# Patient Record
Sex: FEMALE | Race: WHITE | NOT HISPANIC OR LATINO | Employment: FULL TIME | URBAN - METROPOLITAN AREA
[De-identification: names, ages, dates, MRNs, and addresses within clinical notes are randomized per-mention and may not be internally consistent; named-entity substitution may affect disease eponyms.]

---

## 2017-03-06 ENCOUNTER — GENERIC CONVERSION - ENCOUNTER (OUTPATIENT)
Dept: OTHER | Facility: OTHER | Age: 47
End: 2017-03-06

## 2017-06-13 ENCOUNTER — ALLSCRIPTS OFFICE VISIT (OUTPATIENT)
Dept: OTHER | Facility: OTHER | Age: 47
End: 2017-06-13

## 2017-10-24 ENCOUNTER — LAB REQUISITION (OUTPATIENT)
Dept: LAB | Facility: HOSPITAL | Age: 47
End: 2017-10-24
Payer: COMMERCIAL

## 2017-10-24 ENCOUNTER — ALLSCRIPTS OFFICE VISIT (OUTPATIENT)
Dept: OTHER | Facility: OTHER | Age: 47
End: 2017-10-24

## 2017-10-24 DIAGNOSIS — Z01.419 ENCOUNTER FOR GYNECOLOGICAL EXAMINATION WITHOUT ABNORMAL FINDING: ICD-10-CM

## 2017-10-24 DIAGNOSIS — Z12.31 ENCOUNTER FOR SCREENING MAMMOGRAM FOR MALIGNANT NEOPLASM OF BREAST: ICD-10-CM

## 2017-10-24 PROCEDURE — G0145 SCR C/V CYTO,THINLAYER,RESCR: HCPCS | Performed by: OBSTETRICS & GYNECOLOGY

## 2017-10-24 PROCEDURE — 87624 HPV HI-RISK TYP POOLED RSLT: CPT | Performed by: OBSTETRICS & GYNECOLOGY

## 2017-10-25 NOTE — PROGRESS NOTES
Assessment  1  Encounter for annual routine gynecological examination (V72 31) (Z01 419)  2  Encounter for screening mammogram for breast cancer (V76 12) (Z12 31)  3  Genital herpes (054 10) (A60 00)    Plan  Encounter for annual routine gynecological examination    · Follow-up visit in 1 year Evaluation and Treatment  Follow-up  Status: Hold For -  Scheduling  Requested for: [id number]  Ordered; For: Encounter for annual routine gynecological examination;  Ordered By:   Suraj Mcgrath  Performed:   Due: [id number]  Encounter for screening mammogram for breast cancer    · MAMMO SCREENING BILATERAL W 3D & CAD; Status:Hold For - Scheduling; Requested  GSX:[id number];   Perform:[facility] Radiology; QAS:[id number]; Ordered; For:Encounter for screening   mammogram for breast cancer; Ordered By:Gerard Johnston;  Genital herpes    · Start: ValACYclovir HCl - 500 MG Oral Tablet; Take 1 tablet daily  Rx By: Suraj Mcgrath; Dispense: 90 Days ; #:90 Tablet; Refill: 3;For: Genital herpes;   HUMERA = N; Sent To: [facility]    Discussion/Summary  Currently, she eats an adequate diet and has an adequate exercise regimen  the risks and benefits of cervical cancer screening were discussed HPV and Pap Co-testing Done Today Breast cancer screening: the risks and benefits of breast cancer screening were discussed and mammogram has been ordered  Advice and education were given regarding aerobic exercise, weight bearing exercise, weight loss, calcium supplements and vitamin D supplements  NGEintermittent, rec Aleve 1 q 8 hrs with onset of flow, call if worsens  Nl EMB 10/15continue suppressive Rx'17, '201 yr monthlyD Mammo- Strong FH of Breast Ca, S 41 w BRCA neg Ca1,000 mg/d3/wk     Chief Complaint  pt here for yearly exam  LMP unknown  last pap 10/13/2014 last mammo 3/7/17 due for cotesting today      History of Present Illness  HPI: Linn Cruz is here for annual visit  has had no further abnormal periods  has PMS symptoms right now so she believes she is about to have a period although it's been more than 4 weeks  The past few months she has been experiencing hot flashes  has a migraine headache today  They seem to be getting more frequent and severe  Last month she vomited for the first time  recurrences of HSV  Review of Systems    Constitutional: recent 6 lb weight gain, but-- no fever,-- not feeling poorly,-- no chills-- and-- not feeling tired  ENT: no ear ache, no loss of hearing, no nosebleeds or nasal discharge, no sore throat or hoarseness  Cardiovascular: no complaints of slow or fast heart rate, no chest pain, no palpitations, no leg claudication or lower extremity edema  Respiratory: no complaints of shortness of breath, no wheezing, no dyspnea on exertion, no orthopnea or PND  Breasts: no complaints of breast pain, breast lump or nipple discharge  Gastrointestinal: constipation-- and-- stable chronic constipation, but-- no abdominal pain,-- no nausea,-- no vomiting,-- no diarrhea-- and-- no blood in stools  Genitourinary: Coitus is twice monthly w/o problems  , but-- no complaints of dysuria, no incontinence, no pelvic pain, no dysmenorrhea, no vaginal discharge or abnormal vaginal bleeding  Musculoskeletal: no complaints of arthralgia, no myalgia, no joint swelling or stiffness, no limb pain or swelling  Integumentary: no complaints of skin rash or lesion, no itching or dry skin, no skin wounds  Neurological: no complaints of headache, no confusion, no numbness or tingling, no dizziness or fainting  Active Problems  1  Abnormal uterine bleeding (AUB) (626 9) (N93 9)  2  Constipation (564 00) (K59 00)  3  Encounter for annual routine gynecological examination (V72 31) (Z01 419)  4  Genital herpes (054 10) (A60 00)  5   Migraines (346 90) (G43 909)    Past Medical History   · History of H/O:  (V45 89) (Z98 891)     ; C/S Breech 26 , Marginal Abruption, PML- Sigmund  3# 1 oz  3/00;  27 wks, 30% Abruption/PML  Shea 2-11, both had 1st trimester bleeding  LTL   Chronic Constipation/ Rectal fissures  Hemorrhoidectomy '  Migraines  HSV 1&2 '  AUB 10/15 EMB, sounded 7 cm, proliferative  R Foot- 2 Stress Fx's - power walking        Surgical History   · History of  Section   · History of Tubal Ligation    Family History  Father    · Family history of cardiac disorder (V17 49) (Z82 49)  Sister    · Family history of asthma (V17 5) (Z82 5)   · Family history of cardiac disorder (V17 49) (Z82 49)   · Family history of malignant neoplasm of breast (V16 3) (Z80 3)      S- R  breast cancer  41- double mastectomy with chemotherapy and radiation 40, BRCA neg  MC's- Breast Ca 53, 26  MGGM- Breast Ca  F d 53 MI  PGF- d 44 MI     Social History   · Acute alcohol use (Z72 89)   · Currently sexually active   · Daily caffeine consumption   · Exercises regularly   · Never a smoker   · No drug use   · Two children       Marrried  Jannette Brizuela is doing well  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[employer], [address] [facility] Keturah is 16, driving, in the band, but not looking into [employer]  Jessi Jaimes is 14  Current Meds  1  Relpax 40 MG Oral Tablet; Therapy: (Recorded:2016) to Recorded  2  ValACYclovir HCl - 500 MG Oral Tablet; Take 1 tablet daily; Therapy:  to (Evaluate:2017)  Requested for: 2016; Last   Rx:2016 Ordered    Vitals   Recorded: 76SGF1712 09:18AM   Heart Rate 73   Systolic 692   Diastolic 62   Height 5 ft 10 in   Weight 180 lb 6 oz   BMI Calculated 25 88   BSA Calculated 2   O2 Saturation 98   LMP unknown     Physical Exam    Constitutional   General appearance: No acute distress, well appearing and well nourished  Neck   Neck: Normal, supple, trachea midline, no masses  Thyroid: Normal, no thyromegaly  Pulmonary   Respiratory effort: No increased work of breathing or signs of respiratory distress      Auscultation of lungs: Clear to auscultation  Cardiovascular   Auscultation of heart: Normal rate and rhythm, normal S1 and S2, no murmurs  Peripheral vascular exam: Normal pulses Throughout  Genitourinary   External genitalia: Normal and no lesions appreciated  Vagina: Normal, no lesions or dryness appreciated  Urethra: Normal     Urethral meatus: Normal     Bladder: Normal, soft, non-tender and no prolapse or masses appreciated  Cervix: Normal, no palpable masses  Uterus: Normal, non-tender, not enlarged, and no palpable masses  -- AV  Adnexa/parametria: Normal, non-tender and no fullness or masses appreciated  Anus, perineum, and rectum: Normal sphincter tone, no masses, and no prolapse  Chest   Breasts: Normal and no dimpling or skin changes noted  Abdomen   Abdomen: Normal, non-tender, and no organomegaly noted  Liver and spleen: No hepatomegaly or splenomegaly  Examination for hernias: No hernias appreciated  Lymphatic   Palpation of lymph nodes in neck, axillae, groin and/or other locations: No lymphadenopathy or masses noted  Skin   Skin and subcutaneous tissue: Normal skin turgor and no rashes      Palpation of skin and subcutaneous tissue: Normal     Psychiatric   Orientation to person, place, and time: Normal     Mood and affect: Normal        Signatures   Electronically signed by : PAULINE Garnica ; Oct 24 2017  2:06PM EST                       (Author)

## 2017-10-26 ENCOUNTER — GENERIC CONVERSION - ENCOUNTER (OUTPATIENT)
Dept: OTHER | Facility: OTHER | Age: 47
End: 2017-10-26

## 2017-10-27 LAB — HPV RRNA GENITAL QL NAA+PROBE: NORMAL

## 2017-11-01 ENCOUNTER — GENERIC CONVERSION - ENCOUNTER (OUTPATIENT)
Dept: OTHER | Facility: OTHER | Age: 47
End: 2017-11-01

## 2017-11-01 LAB
LAB AP GYN PRIMARY INTERPRETATION: NORMAL
Lab: NORMAL

## 2018-01-12 VITALS
OXYGEN SATURATION: 98 % | SYSTOLIC BLOOD PRESSURE: 116 MMHG | BODY MASS INDEX: 25.5 KG/M2 | DIASTOLIC BLOOD PRESSURE: 58 MMHG | HEIGHT: 70 IN | WEIGHT: 178.13 LBS | HEART RATE: 74 BPM

## 2018-01-13 VITALS
HEART RATE: 73 BPM | BODY MASS INDEX: 25.82 KG/M2 | OXYGEN SATURATION: 98 % | DIASTOLIC BLOOD PRESSURE: 62 MMHG | WEIGHT: 180.38 LBS | HEIGHT: 70 IN | SYSTOLIC BLOOD PRESSURE: 106 MMHG

## 2018-01-17 NOTE — RESULT NOTES
Verified Results  (1) THIN PREP PAP WITH IMAGING [id number] 09:43AM Pricila ENNIS Order Number: [id number]     Test Name Result Flag Reference   LAB AP CASE REPORT (Report)     Gynecologic Cytology Report            Case: [id number]                  Authorizing Provider: Vernon Omalley MD     Collected:      10/24/2017 0943        First Screen:     SAMMY Bauer Received:      10/26/2017 1023        Specimen:  LIQUID-BASED PAP, SCREENING, Cervix   HPV HIGH RISK RESULT (Report)     HPV, High Risk: HPV NEG, HPV16 NEG, HPV18 NEG      Other High Risk HPV Negative, HPV 16 Negative, HPV 18 Negative  HPV types: 16,18,31,33,35,39,45,51,52,56,58,59,66 and 68 DNA are undetectable or below the pre-set threshold  Microdermis FDA approved Amelia 4800 is utilized with strict adherence to the manufacturers instruction  manual to test for the presence of High-Risk HPV DNA, as well as HPV 16 and HPV 18  This instrument  has been validated by our laboratory and/or by the   A negative result does not preclude the presence of HPV infection because results depend on adequate  specimen collection, absence of inhibitors and sufficient DNA to be detected  Additionally, HPV negative  results are not intended to prevent women from proceeding to colposcopy if clinically warranted  Positive HPV test results indicate the presence of any one or more of the high risk types, but since patients  are often co-infected with low-risk types it does not rule out the presence of low-risk types in patients  with mixed infections  LAB AP GYN PRIMARY INTERPRETATION      Negative for intraepithelial lesion or malignancy  Electronically signed by SAMMY Bauer on 11/1/2017 at 10:23 AM   LAB AP GYN SPECIMEN ADEQUACY      Satisfactory for evaluation  Endocervical/transformation zone component present     LAB AP GYN ADDITIONAL INFORMATION (Report)     [employer]'s FDA approved ,  and ThinPrep Imaging System are   utilized with strict adherence to the 's instruction manual to   prepare gynecologic and non-gynecologic cytology specimens for the   production of ThinPrep slides as well as for gynecologic ThinPrep imaging  These processes have been validated by our laboratory and/or by the     The Pap test is not a diagnostic procedure and should not be used as the   sole means to detect cervical cancer  It is only a screening procedure to   aid in the detection of cervical cancer and its precursors  Both   false-negative and false-positive results have been experienced  Your   patient's test result should be interpreted in this context together with   the history and clinical findings

## 2018-05-03 ENCOUNTER — TELEPHONE (OUTPATIENT)
Dept: GYNECOLOGY | Facility: CLINIC | Age: 48
End: 2018-05-03

## 2018-05-03 DIAGNOSIS — R92.8 ABNORMAL MAMMOGRAM OF RIGHT BREAST: Primary | ICD-10-CM

## 2018-05-03 NOTE — TELEPHONE ENCOUNTER
Daren Blanton from [facility] called RE abnormal mammo result  Can you please look at result (in her chart under media tab) she is going back for additional testing on Monday and needs the script

## 2018-05-14 ENCOUNTER — HOSPITAL ENCOUNTER (OUTPATIENT)
Dept: ULTRASOUND IMAGING | Facility: CLINIC | Age: 48
Discharge: HOME | End: 2018-05-14
Payer: COMMERCIAL

## 2018-05-14 ENCOUNTER — HOSPITAL ENCOUNTER (OUTPATIENT)
Dept: MAMMOGRAPHY | Facility: CLINIC | Age: 48
Discharge: HOME | End: 2018-05-14
Payer: COMMERCIAL

## 2018-05-14 DIAGNOSIS — R92.8 ABNORMAL MAMMOGRAM OF RIGHT BREAST: ICD-10-CM

## 2018-05-14 DIAGNOSIS — R92.8 ABNORMAL MAMMOGRAM: ICD-10-CM

## 2018-05-14 PROCEDURE — 77065 DX MAMMO INCL CAD UNI: CPT

## 2018-05-14 PROCEDURE — G0279 TOMOSYNTHESIS, MAMMO: HCPCS

## 2018-05-15 DIAGNOSIS — R92.8 ABNORMALITY OF RIGHT BREAST ON SCREENING MAMMOGRAM: Primary | ICD-10-CM

## 2018-05-24 ENCOUNTER — HOSPITAL ENCOUNTER (OUTPATIENT)
Dept: ULTRASOUND IMAGING | Facility: CLINIC | Age: 48
Discharge: HOME | End: 2018-05-24
Payer: COMMERCIAL

## 2018-05-24 DIAGNOSIS — R92.8 ABNORMALITY OF RIGHT BREAST ON SCREENING MAMMOGRAM: ICD-10-CM

## 2018-05-24 PROCEDURE — 76642 ULTRASOUND BREAST LIMITED: CPT

## 2018-05-24 RX ORDER — ELETRIPTAN HYDROBROMIDE 40 MG/1
TABLET, FILM COATED ORAL
COMMUNITY
End: 2018-10-31 | Stop reason: ALTCHOICE

## 2018-05-24 RX ORDER — MULTIVITAMIN
1 TABLET ORAL DAILY
COMMUNITY

## 2018-05-24 NOTE — PROGRESS NOTES
Met with patient and Dr Marly James regarding recommendation for;      __x___ RIGHT ______LEFT      __x___Ultrasound guided  ______Stereotactic  Breast biopsy  __x___Verbalized understanding        Blood thinners:  _____yes ___x__no    Date stopped: ____n/a_______    Biopsy teaching sheet given:  ____x___yes ______no

## 2018-06-07 ENCOUNTER — HOSPITAL ENCOUNTER (OUTPATIENT)
Dept: ULTRASOUND IMAGING | Facility: CLINIC | Age: 48
Discharge: HOME | End: 2018-06-07

## 2018-06-11 ENCOUNTER — HOSPITAL ENCOUNTER (OUTPATIENT)
Dept: ULTRASOUND IMAGING | Facility: CLINIC | Age: 48
Discharge: HOME | End: 2018-06-11
Payer: COMMERCIAL

## 2018-06-11 ENCOUNTER — HOSPITAL ENCOUNTER (OUTPATIENT)
Dept: MAMMOGRAPHY | Facility: CLINIC | Age: 48
Discharge: HOME | End: 2018-06-11

## 2018-06-11 ENCOUNTER — HOSPITAL ENCOUNTER (OUTPATIENT)
Dept: ULTRASOUND IMAGING | Facility: CLINIC | Age: 48
Discharge: HOME | End: 2018-06-11
Admitting: RADIOLOGY
Payer: COMMERCIAL

## 2018-06-11 VITALS — DIASTOLIC BLOOD PRESSURE: 64 MMHG | SYSTOLIC BLOOD PRESSURE: 112 MMHG | HEART RATE: 68 BPM

## 2018-06-11 DIAGNOSIS — R92.8 ABNORMAL MAMMOGRAM: ICD-10-CM

## 2018-06-11 DIAGNOSIS — R92.8 ABNORMAL ULTRASOUND OF BREAST: ICD-10-CM

## 2018-06-11 PROCEDURE — 88305 TISSUE EXAM BY PATHOLOGIST: CPT | Performed by: PATHOLOGY

## 2018-06-11 PROCEDURE — 19083 BX BREAST 1ST LESION US IMAG: CPT

## 2018-06-11 PROCEDURE — 19084 BX BREAST ADD LESION US IMAG: CPT

## 2018-06-11 RX ORDER — LIDOCAINE HYDROCHLORIDE 10 MG/ML
5 INJECTION, SOLUTION INFILTRATION; PERINEURAL ONCE
Status: COMPLETED | OUTPATIENT
Start: 2018-06-11 | End: 2018-06-11

## 2018-06-11 RX ADMIN — LIDOCAINE HYDROCHLORIDE 5 ML: 10 INJECTION, SOLUTION INFILTRATION; PERINEURAL at 14:34

## 2018-06-11 RX ADMIN — LIDOCAINE HYDROCHLORIDE 5 ML: 10 INJECTION, SOLUTION INFILTRATION; PERINEURAL at 14:44

## 2018-06-11 NOTE — PROGRESS NOTES
Procedure type: (1st site)    __x___ultrasound guided _____stereotactic    Breast:    _____Left __x___Right    Location: 11:00 periareolar    Needle: 12g Marquee    # of passes: 3 (specimen A)    Clip: Ultraclip-heart    Performed by: Dr Richardson Tesfaye held for 5 minutes by: Ayana Fregoso Strips:    __x___yes _____no    Band aid:    __x___yes_____no    Tape and guaze:    _____yes __x___no    Tolerated procedure:    __x___yes _____no      Procedure type: (2nd site)    __x___ultrasound guided _____stereotactic    Breast:    _____Left __x___Right    Location: 2:00 5cm from nipple    Needle: 12g Marquee    # of passes: 3 (specimen B)    Clip: Ultraclip-wing    Performed by: Dr Richardson Tesfaye held for 5 minutes by: Ayana Fregoso Strips:    __x___yes _____no    Band aid:    __x___yes_____no    Tape and guaze:    _____yes __x___no    Tolerated procedure:    __x___yes _____no

## 2018-06-11 NOTE — DISCHARGE INSTR - OTHER ORDERS
POST LARGE CORE BREAST BIOPSY PATIENT INFORMATION      1  Place an ice pack inside your bra over the top of the dressing every hour for 20 minutes (20 minutes on, 60 minutes off)  Do this until bedtime  2  Do not shower or bathe until the following morning  3  You may bathe your breast carefully with the steri-strips in place  Be careful    Not to loosen them  The steri-strips will fall off in 3-5 days  4  You may have mild discomfort, and you may have some bruising where the   Needle entered the skin  This should clear within 5-7 days  5  If you need medicine for discomfort, take acetaminophen products such as   Tylenol  You may also take Advil or Motrin products  6  Do not participate in strenuous activities such as-tennis, aerobics, skiing,  Weight lifting, etc  for 24 hours  Refrain from swimming/soaking for 72 hours  7  Wearing a bra for sleeping may be more comfortable for the first 24-48 hours  8  Watch for continued bleeding, pain or fever over 101; please call with any questions or concerns  For procedures done at the [facility] call:  Dustin Rushing RN at [phone]  Lindsey Conroy RN at [phone]                    *After 4 PM call the Interventional Radiology Department                    [phone] and ask to speak with the nurse on call  For procedures done at the [address] [place] [address] call:         Aiden Pedroza RN at   *After 4 PM call the Interventional Radiology Department   [phone] and ask to speak with the nurse on call  For procedures done at [address] [place] [address] call: The Radiology Nurse at [phone]  *After 4 PM call your physician, or go to the Emergency Department  9          The final results of your biopsy are usually available within one week

## 2018-06-11 NOTE — PROGRESS NOTES
Patient arrived via:    __x___ambulatory    _____wheelchair    _____stretcher      Domestic violence screen    __x____negative______positive    Breast Implants:    _______yes ____x____no

## 2018-06-12 NOTE — PROGRESS NOTES
Post procedure call completed on 6/12/18 at 1328    Bleeding: _____yes __x___no    Pain: _____yes ___x___no    Redness/Swelling: ______yes ___x___no    Band aid removed: _____yes __x___no    Steri-Strips intact: ___x___yes _____no

## 2018-06-14 ENCOUNTER — TELEPHONE (OUTPATIENT)
Dept: MAMMOGRAPHY | Facility: CLINIC | Age: 48
End: 2018-06-14

## 2018-09-19 ENCOUNTER — TRANSCRIBE ORDERS (OUTPATIENT)
Dept: ADMINISTRATIVE | Facility: HOSPITAL | Age: 48
End: 2018-09-19

## 2018-09-19 DIAGNOSIS — M85.80 OSTEOPENIA, UNSPECIFIED LOCATION: Primary | ICD-10-CM

## 2018-10-08 ENCOUNTER — HOSPITAL ENCOUNTER (OUTPATIENT)
Dept: RADIOLOGY | Facility: MEDICAL CENTER | Age: 48
Discharge: HOME | End: 2018-10-08
Payer: COMMERCIAL

## 2018-10-08 DIAGNOSIS — M85.80 OSTEOPENIA, UNSPECIFIED LOCATION: ICD-10-CM

## 2018-10-08 PROCEDURE — 77080 DXA BONE DENSITY AXIAL: CPT

## 2018-10-30 PROBLEM — Z01.419 ENCOUNTER FOR ANNUAL ROUTINE GYNECOLOGICAL EXAMINATION: Status: ACTIVE | Noted: 2018-10-30

## 2018-10-30 PROBLEM — Z12.31 ENCOUNTER FOR SCREENING MAMMOGRAM FOR MALIGNANT NEOPLASM OF BREAST: Status: ACTIVE | Noted: 2018-10-30

## 2018-10-30 NOTE — PROGRESS NOTES
Assessment/Plan:  NGE  Perimenopausal- menses February and October this year  Past Menorrhagia- intermittent, rec Aleve 1 q 8 hrs with onset of flow, call if worsens  Nl EMB 10/15  Vaginal dryness - silicone lubricant  HSV- no recurrences while off suppressive therapy- changed to episodic Rx  CoTesting '17, '22  RTO 1 yr  SBE monthly  3 D Mammo- Strong FH of Breast Ca, S 40 w BRCA neg Ca  Ca 1,000 mg/d-    Exercise 3/wk - 3rd stress Fx  Colonoscopy- due soon       Diagnoses and all orders for this visit:    Encounter for annual routine gynecological examination    Encounter for screening mammogram for malignant neoplasm of breast  -     Mammo screening bilateral w 3d & cad; Future    History of herpes genitalis    Other orders  -     Discontinue: Multiple Vitamins-Minerals (MULTIVITAMIN ADULT PO); Take 1 tablet by mouth  -     prednisoLONE acetate (PRED FORTE) 1 % ophthalmic suspension; PUT 1 DROP INTO LEFT EYE 3 TIMES A DAY  -     valACYclovir (VALTREX) 500 mg tablet; Take 1 tablet by mouth daily  -     aspirin-acetaminophen-caffeine (EXCEDRIN MIGRAINE) 536-588-62 MG per tablet; Take by mouth  -     eletriptan (RELPAX) 40 MG tablet; Take one tablet at onset of migraine    One tablet 2 hours later PRN  -     Calcium Carb-Cholecalciferol (CALCIUM 600-D PO); Take by mouth              Subjective:        Patient ID: Karoline Lemon is a 46 y o  female  Susan is here for an annual visit  Her periods have been coming irregular  Her last normal menses was this past February  During the summer she had hot flashes, night sweats and recently vaginal dryness with intercourse  On October 25th she had another period  In August she had a 3rd stress fracture  All 3 have involved right foot  She had a bone density study recently and is seeing a physician today to discuss the results  She has not been able to exercise and is result has gained another 7 lb this year for total of 13 in the past 2 years      She still takes to stool softeners daily and then a fiber for constipation  She is due to have a colonoscopy soon  The following portions of the patient's history were reviewed and updated as appropriate: She  has a past medical history of Stress fracture of foot  Patient Active Problem List    Diagnosis Date Noted    History of herpes genitalis 10/31/2018    Encounter for annual routine gynecological examination 10/30/2018    Encounter for screening mammogram for malignant neoplasm of breast 10/30/2018   PMH:  Kartik Jo; C/S Breech 28 , Marginal Abruption, PML- Chevis Speaker 3# 1 oz  3/00;  27 wks, 30% Abruption/PML  Shea 2-11, both had 1st trimester bleeding  LTL   Chronic Constipation/ Rectal fissures  Hemorrhoidectomy '  Migraines  HSV 1&2 '  AUB 10/15 EMB, sounded 7 cm, proliferative  R Foot- 2 Stress Fx's - power walking    She  has a past surgical history that includes Breast lumpectomy  Her family history includes Breast cancer in her sister; Heart attack in her father and paternal grandfather; No Known Problems in her daughter, mother, sister, and son  FH:  S- R breast cancer 41- double mastectomy with chemotherapy and radiation 40, BRCA neg  MC's- Breast Ca 53, 26  MGGM- Breast Ca  F d 53 MI  PGF- d 44 MI     She  reports that she has never smoked  She has never used smokeless tobacco  She reports that she does not drink alcohol or use drugs  SH:  Marrrhenna Ramirez is doing well  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[employer], [address]  Chevis Speaker  is  23,  driving, in the band, studying math at [employer]  Lesia Katz is 14  Senior playing volleyball at [employer]  Current Outpatient Prescriptions   Medication Sig Dispense Refill    aspirin-acetaminophen-caffeine (EXCEDRIN MIGRAINE) 250-250-65 MG per tablet Take by mouth      Calcium Carb-Cholecalciferol (CALCIUM 600-D PO) Take by mouth      eletriptan (RELPAX) 40 MG tablet Take one tablet at onset of migraine    One tablet 2 hours later PRN      Multiple Vitamin (MULTIVITAMIN) tablet Take 1 tablet by mouth daily      prednisoLONE acetate (PRED FORTE) 1 % ophthalmic suspension PUT 1 DROP INTO LEFT EYE 3 TIMES A DAY  0    valACYclovir (VALTREX) 500 mg tablet Take 1 tablet by mouth daily       No current facility-administered medications for this visit  Current Outpatient Prescriptions on File Prior to Visit   Medication Sig    Multiple Vitamin (MULTIVITAMIN) tablet Take 1 tablet by mouth daily    [DISCONTINUED] Aspirin-Acetaminophen-Caffeine (EXCEDRIN PO) Take by mouth    [DISCONTINUED] eletriptan (RELPAX) 40 MG tablet Take by mouth     No current facility-administered medications on file prior to visit  She is allergic to other       Review of Systems   Constitutional: Negative for activity change, appetite change, fatigue and unexpected weight change  Hot flashes and night sweats during the past summer  Eyes: Negative for visual disturbance  Respiratory: Negative for cough, chest tightness, shortness of breath and wheezing  Cardiovascular: Negative for chest pain, palpitations and leg swelling  Breast: Patient denies tenderness, nipple discharge, masses, or erythema  Gastrointestinal: Positive for constipation (Chronic for which she takes Colace twice a day and then a fiber)  Negative for abdominal distention, abdominal pain, blood in stool, diarrhea, nausea and vomiting  Endocrine: Negative for cold intolerance and heat intolerance  Genitourinary: Positive for dyspareunia  Negative for decreased urine volume, difficulty urinating, dysuria, frequency, hematuria, menstrual problem, pelvic pain, urgency, vaginal bleeding, vaginal discharge and vaginal pain  Painful intercourse which is limited frequency at approximately once a month  Musculoskeletal: Negative for arthralgias  Third stress fracture in the same foot, right, this past August   Skin: Negative for rash  Neurological: Negative for weakness, light-headedness, numbness and headaches  Hematological: Does not bruise/bleed easily  Psychiatric/Behavioral: Negative for agitation, behavioral problems and sleep disturbance  The patient is not nervous/anxious  Objective:    Vitals:    10/31/18 0904   BP: 102/60   BP Location: Right arm   Patient Position: Sitting   Pulse: 64   Weight: 84 9 kg (187 lb 3 2 oz)   Height: 5' 10" (1 778 m)            Physical Exam   Constitutional: She is oriented to person, place, and time  She appears well-developed and well-nourished  HENT:   Head: Normocephalic and atraumatic  Eyes: Pupils are equal, round, and reactive to light  Conjunctivae and EOM are normal    Neck: Normal range of motion  Neck supple  No tracheal deviation present  No thyromegaly present  Cardiovascular: Normal rate, regular rhythm and normal heart sounds  No murmur heard  Pulmonary/Chest: Effort normal and breath sounds normal  No respiratory distress  She has no wheezes  Right breast exhibits no inverted nipple, no mass, no nipple discharge, no skin change and no tenderness  Left breast exhibits no inverted nipple, no mass, no nipple discharge, no skin change and no tenderness  Breasts are symmetrical    Abdominal: Soft  Bowel sounds are normal  She exhibits no distension and no mass  There is no tenderness  Genitourinary: Vagina normal and uterus normal  Rectal exam shows no external hemorrhoid  No breast swelling, tenderness, discharge or bleeding  There is no rash, tenderness or lesion on the right labia  There is no rash, tenderness or lesion on the left labia  Uterus is not deviated, not enlarged and not tender  Cervix exhibits no motion tenderness and no discharge  Right adnexum displays no mass, no tenderness and no fullness  Left adnexum displays no mass, no tenderness and no fullness  Genitourinary Comments: Vagina dry due tampon    Normal rugations   Musculoskeletal: Normal range of motion  Neurological: She is alert and oriented to person, place, and time  Skin: Skin is warm and dry  Psychiatric: She has a normal mood and affect  Her behavior is normal  Judgment and thought content normal    Nursing note and vitals reviewed

## 2018-10-31 ENCOUNTER — ANNUAL EXAM (OUTPATIENT)
Dept: GYNECOLOGY | Facility: CLINIC | Age: 48
End: 2018-10-31
Payer: COMMERCIAL

## 2018-10-31 VITALS
WEIGHT: 187.2 LBS | HEART RATE: 64 BPM | HEIGHT: 70 IN | DIASTOLIC BLOOD PRESSURE: 60 MMHG | BODY MASS INDEX: 26.8 KG/M2 | SYSTOLIC BLOOD PRESSURE: 102 MMHG

## 2018-10-31 DIAGNOSIS — Z86.19 HISTORY OF HERPES GENITALIS: ICD-10-CM

## 2018-10-31 DIAGNOSIS — Z01.419 ENCOUNTER FOR ANNUAL ROUTINE GYNECOLOGICAL EXAMINATION: Primary | ICD-10-CM

## 2018-10-31 DIAGNOSIS — Z12.31 ENCOUNTER FOR SCREENING MAMMOGRAM FOR MALIGNANT NEOPLASM OF BREAST: ICD-10-CM

## 2018-10-31 PROCEDURE — S0612 ANNUAL GYNECOLOGICAL EXAMINA: HCPCS | Performed by: OBSTETRICS & GYNECOLOGY

## 2018-10-31 RX ORDER — VALACYCLOVIR HYDROCHLORIDE 500 MG/1
1 TABLET, FILM COATED ORAL DAILY
COMMUNITY
Start: 2016-10-19 | End: 2019-11-22

## 2018-10-31 RX ORDER — ELETRIPTAN HYDROBROMIDE 40 MG/1
TABLET, FILM COATED ORAL
COMMUNITY
Start: 2018-06-07

## 2018-10-31 RX ORDER — PREDNISOLONE ACETATE 10 MG/ML
SUSPENSION/ DROPS OPHTHALMIC
Refills: 0 | COMMUNITY
Start: 2018-10-08 | End: 2021-12-10 | Stop reason: ALTCHOICE

## 2018-10-31 RX ORDER — VALACYCLOVIR HYDROCHLORIDE 500 MG/1
500 TABLET, FILM COATED ORAL 2 TIMES DAILY
Qty: 6 TABLET | Refills: 3 | Status: SHIPPED | OUTPATIENT
Start: 2018-10-31 | End: 2018-11-03

## 2018-10-31 RX ORDER — ACETAMINOPHEN, ASPIRIN AND CAFFEINE 250; 250; 65 MG/1; MG/1; MG/1
TABLET, FILM COATED ORAL
COMMUNITY

## 2019-08-08 ENCOUNTER — HOSPITAL ENCOUNTER (OUTPATIENT)
Dept: MAMMOGRAPHY | Facility: CLINIC | Age: 49
Discharge: HOME | End: 2019-08-08
Payer: COMMERCIAL

## 2019-08-08 VITALS — HEIGHT: 70 IN | WEIGHT: 187 LBS | BODY MASS INDEX: 26.77 KG/M2

## 2019-08-08 DIAGNOSIS — Z12.31 ENCOUNTER FOR SCREENING MAMMOGRAM FOR MALIGNANT NEOPLASM OF BREAST: ICD-10-CM

## 2019-08-08 PROCEDURE — 77063 BREAST TOMOSYNTHESIS BI: CPT

## 2019-08-08 PROCEDURE — 77067 SCR MAMMO BI INCL CAD: CPT

## 2019-11-21 NOTE — PROGRESS NOTES
Assessment/Plan:  NGE  Perimenopausal- menses irregular   Vaginal dryness - silicone lubricant  HSV- no recurrences while off suppressive therapy- changed to episodic Rx  Osteopenia- BMD 10/18 T-1 8 Spine, FRAX 0 1/3 8%  CoTesting   RTO 1 yr  SBE monthly  3 D Mammo- Strong FH of Breast Ca, S 40 w BRCA neg Ca  Ca 1,200 mg/d-    Exercise 5/wk - 3rd stress Fx  Colonoscopy- due soon       Diagnoses and all orders for this visit:    Encounter for annual routine gynecological examination    Encounter for screening mammogram for malignant neoplasm of breast  -     Mammo screening bilateral w 3d & cad; Future    History of herpes genitalis    Osteopenia of lumbar spine              Subjective:        Patient ID: Darya Beth is a 44 y o  female  Susan is here for her annual visit  Her periods are irregular  Her last period was in September  [place] is twice a month  She lost 2 lb  She has intermittent hot flashes  No outbreaks of herpes this past year  The following portions of the patient's history were reviewed and updated as appropriate: She  has a past medical history of Stress fracture of foot  Patient Active Problem List    Diagnosis Date Noted    History of herpes genitalis 10/31/2018    Encounter for annual routine gynecological examination 10/30/2018    Encounter for screening mammogram for malignant neoplasm of breast 10/30/2018   PMH:  [place] B[place]; C/S Breech 28 2, Marginal Abruption, PML- Lele Leyden 3# 1 oz  3;  27 wks, 30% Abruption/PML  Shea 2-11, both had 1st trimester bleeding  LTL   Chronic Constipation/ Rectal fissures  Hemorrhoidectomy '  Migraines  HSV 1&2 '  AUB 10/15 EMB, sounded 7 cm, proliferative      R Foot- 2 Stress Fx's - power walking      R Foot -3rd Stress Fx  ;  BMD 10/18      R Breast Bx (2)       L Ripped Retina  laser     She  has a past surgical history that includes Breast lumpectomy; US guided breast biopsy right complete (Right, 6/11/2018); US guidance breast biopsy right each additional (Right, 6/11/2018); and Breast biopsy (Right, 06/11/2018)  Her family history includes BRCA1 Negative in her sister; BRCA2 Negative in her sister; Breast cancer (age of onset: 37) in her sister; Heart attack in her father and paternal grandfather; No Known Problems in her daughter, maternal aunt, mother, paternal aunt, sister, and son  FH:  S- R breast cancer 41- double mastectomy with chemotherapy and radiation 40, BRCA neg  MC's- Breast Ca 53, 26  MGGM- Breast Ca  F d 55 MI  PGF- d 46 MI      She  reports that she has never smoked  She has never used smokeless tobacco  She reports that she does not drink alcohol or use drugs  SH:  Sarah Gil is doing well  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  Brook Rizo  is  21  and at [employer], studied math at [employer] for year  Speedy Holliday is 23, freshman at [employer]  Playing club volleyball      Current Outpatient Medications   Medication Sig Dispense Refill    aspirin-acetaminophen-caffeine (EXCEDRIN MIGRAINE) 250-250-65 MG per tablet Take by mouth      Calcium Carb-Cholecalciferol (CALCIUM 600-D PO) Take by mouth      eletriptan (RELPAX) 40 MG tablet Take one tablet at onset of migraine    One tablet 2 hours later PRN      Multiple Vitamin (MULTIVITAMIN) tablet Take 1 tablet by mouth daily      prednisoLONE acetate (PRED FORTE) 1 % ophthalmic suspension PUT 1 DROP INTO LEFT EYE 3 TIMES A DAY  0    valACYclovir (VALTREX) 500 mg tablet Take 1 tablet by mouth daily       No current facility-administered medications for this visit  Current Outpatient Medications on File Prior to Visit   Medication Sig    aspirin-acetaminophen-caffeine (EXCEDRIN MIGRAINE) 250-250-65 MG per tablet Take by mouth    Calcium Carb-Cholecalciferol (CALCIUM 600-D PO) Take by mouth    eletriptan (RELPAX) 40 MG tablet Take one tablet at onset of migraine    One tablet 2 hours later PRN    Multiple Vitamin (MULTIVITAMIN) tablet Take 1 tablet by mouth daily    prednisoLONE acetate (PRED FORTE) 1 % ophthalmic suspension PUT 1 DROP INTO LEFT EYE 3 TIMES A DAY    valACYclovir (VALTREX) 500 mg tablet Take 1 tablet by mouth daily     No current facility-administered medications on file prior to visit  She is allergic to other       Review of Systems   Constitutional: Negative for activity change, appetite change, fatigue and unexpected weight change  Eyes: Negative for visual disturbance  Respiratory: Negative for cough, chest tightness, shortness of breath and wheezing  Cardiovascular: Negative for chest pain, palpitations and leg swelling  Breast: Patient denies tenderness, nipple discharge, masses, or erythema  Gastrointestinal: Negative for abdominal distention, abdominal pain, blood in stool, constipation, diarrhea, nausea and vomiting  Endocrine: Negative for cold intolerance and heat intolerance  Genitourinary: Negative for decreased urine volume, difficulty urinating, dyspareunia, dysuria, frequency, hematuria, menstrual problem, pelvic pain, urgency, vaginal bleeding, vaginal discharge and vaginal pain  [place] twice a month  A lubricant is used for vaginal dryness  Musculoskeletal: Negative for arthralgias  Skin: Negative for rash  Neurological: Negative for weakness, light-headedness, numbness and headaches  Hematological: Does not bruise/bleed easily  Psychiatric/Behavioral: Negative for agitation, behavioral problems and sleep disturbance  The patient is not nervous/anxious  Objective: There were no vitals filed for this visit  Physical Exam   Constitutional: She is oriented to person, place, and time  She appears well-developed and well-nourished  HENT:   Head: Normocephalic and atraumatic  Eyes: Pupils are equal, round, and reactive to light  Conjunctivae and EOM are normal    Neck: Normal range of motion  Neck supple  No tracheal deviation present   No thyromegaly present  Cardiovascular: Normal rate, regular rhythm and normal heart sounds  No murmur heard  Pulmonary/Chest: Effort normal and breath sounds normal  No respiratory distress  She has no wheezes  Right breast exhibits no inverted nipple, no mass, no nipple discharge, no skin change and no tenderness  Left breast exhibits no inverted nipple, no mass, no nipple discharge, no skin change and no tenderness  No breast tenderness, discharge or bleeding  Breasts are symmetrical    Abdominal: Soft  Bowel sounds are normal  She exhibits no distension and no mass  There is no tenderness  Genitourinary: Vagina normal and uterus normal  Rectal exam shows no external hemorrhoid  No breast tenderness, discharge or bleeding  There is no rash, tenderness or lesion on the right labia  There is no rash, tenderness or lesion on the left labia  Uterus is not deviated, not enlarged and not tender  Cervix exhibits no motion tenderness and no discharge  Right adnexum displays no mass, no tenderness and no fullness  Left adnexum displays no mass, no tenderness and no fullness  Genitourinary Comments: Urethral meatus within normal limits  Perineum within normal limits  Bladder well supported  Musculoskeletal: Normal range of motion  Neurological: She is alert and oriented to person, place, and time  Skin: Skin is warm and dry  Psychiatric: She has a normal mood and affect  Her behavior is normal  Judgment and thought content normal    Nursing note and vitals reviewed

## 2019-11-22 ENCOUNTER — ANNUAL EXAM (OUTPATIENT)
Dept: GYNECOLOGY | Facility: CLINIC | Age: 49
End: 2019-11-22
Payer: COMMERCIAL

## 2019-11-22 VITALS
WEIGHT: 184.6 LBS | DIASTOLIC BLOOD PRESSURE: 80 MMHG | HEIGHT: 70 IN | BODY MASS INDEX: 26.43 KG/M2 | SYSTOLIC BLOOD PRESSURE: 120 MMHG | HEART RATE: 80 BPM

## 2019-11-22 DIAGNOSIS — M85.88 OSTEOPENIA OF LUMBAR SPINE: ICD-10-CM

## 2019-11-22 DIAGNOSIS — Z86.19 HISTORY OF HERPES GENITALIS: ICD-10-CM

## 2019-11-22 DIAGNOSIS — Z01.419 ENCOUNTER FOR ANNUAL ROUTINE GYNECOLOGICAL EXAMINATION: Primary | ICD-10-CM

## 2019-11-22 DIAGNOSIS — Z12.31 ENCOUNTER FOR SCREENING MAMMOGRAM FOR MALIGNANT NEOPLASM OF BREAST: ICD-10-CM

## 2019-11-22 PROCEDURE — 99396 PREV VISIT EST AGE 40-64: CPT | Performed by: OBSTETRICS & GYNECOLOGY

## 2020-10-06 ENCOUNTER — HOSPITAL ENCOUNTER (OUTPATIENT)
Dept: MAMMOGRAPHY | Facility: HOSPITAL | Age: 50
Discharge: HOME | End: 2020-10-06
Attending: OBSTETRICS & GYNECOLOGY
Payer: COMMERCIAL

## 2020-10-06 VITALS — HEIGHT: 70 IN | BODY MASS INDEX: 23.34 KG/M2 | WEIGHT: 163 LBS

## 2020-10-06 DIAGNOSIS — Z12.31 ENCOUNTER FOR SCREENING MAMMOGRAM FOR MALIGNANT NEOPLASM OF BREAST: ICD-10-CM

## 2020-10-06 PROCEDURE — 77063 BREAST TOMOSYNTHESIS BI: CPT

## 2020-10-06 PROCEDURE — 77067 SCR MAMMO BI INCL CAD: CPT

## 2020-10-07 DIAGNOSIS — Z91.89 INCREASED RISK OF BREAST CANCER: Primary | ICD-10-CM

## 2021-01-24 NOTE — PROGRESS NOTES
Assessment/Plan:  NGE  PMB 10/20   Vaginal dryness - silicone lubricant, probable vaginal estrogen  [employer] [employer]a: 33 26 % of Breast Ca  HSV- 1 recurrence '20- episodic Rx  Osteopenia- BMD 10/18 T-1 8 Spine, FRAX 0 1/3 8% - no one addressed  CoTesting '17, '22  RTO 1 yr  SBE monthly  3 D Mammo- Strong FH of Breast Ca, S 40 w BRCA neg Ca  Ca 1,200 mg/d-  with 2,000 IU- 900 deficient  Exercise 5/wk - 3rd stress Fx, walks daily  Colonoscopy- '19 nl , repeat '29       Diagnoses and all orders for this visit:    Encounter for annual routine gynecological examination    Postmenopausal bleeding  -     US pelvis complete non OB; Future    Dyspareunia in female  -     US pelvis complete non OB; Future    Encounter for screening mammogram for malignant neoplasm of breast  -     Mammo screening bilateral w 3d & cad; Future    Increased risk of breast cancer    History of herpes genitalis    Osteopenia of lumbar spine    Atrophic vaginitis      - active order for MRI for 4/21        Subjective:        Patient ID: Iraj Moe is a 44 y o  female  Susan is here for her yearly evaluation  Her last menstrual period was 9/19   For the past several months she has noted painful intercourse  It almost feels as if he is hitting something  She continues to use a lubricant  On the day she had her mammogram in October she noticed bleeding  It only lasted 1 day and was painless  She thought it was related to the mammogram   [place] has become infrequent due to the discomfort  She has only had 1 episode of herpes this past year  A podiatrist had ordered her BMD but no one is really reviewed with her  I suggested she discuss this with her PCP but reviewed recommendations for calcium and exercise  She has been able to lose 17 lb since last seen due to exercise and intermittent fasting        The following portions of the patient's history were reviewed and updated as appropriate: She  has a past medical history of Stress fracture of foot  Patient Active Problem List    Diagnosis Date Noted    History of herpes genitalis 10/31/2018    Encounter for annual routine gynecological examination 10/30/2018    Encounter for screening mammogram for malignant neoplasm of breast 10/30/2018   PMH:  Jony Verdin; C/S Breech 28 , Marginal Abruption, PML- Meka Durie 3# 1 oz  3/00;  27 wks, 30% Abruption/PML  Shea 2-11, both had 1st trimester bleeding  LTL   Chronic Constipation/ Rectal fissures  Hemorrhoidectomy   Migraines  HSV 1&2 '  AUB 10/15 EMB, sounded 7 cm, proliferative      R Foot- 2 Stress Fx's - power walking      R Foot -3rd Stress Fx  ; BMD 10/18      R Breast Bx (2)       L Ripped Retina  laser      PMB 10/20      Dyspareunia '  She  has a past surgical history that includes Breast lumpectomy; US guided breast biopsy right complete (Right, 2018); US guidance breast biopsy right each additional (Right, 2018); and Breast biopsy (Right, 2018)  Her family history includes BRCA1 Negative in her sister; BRCA2 Negative in her sister; Breast cancer (age of onset: 37) in her sister; Heart attack in her father and paternal grandfather; No Known Problems in her daughter, maternal aunt, mother, paternal aunt, sister, and son  FH:  S- R breast cancer 41- double mastectomy with chemotherapy and radiation 40, BRCA neg  MC's- Breast Ca 53, 26  MGGM- Breast Ca  F d 46 MI  PGF- d 46 MI   She  reports that she has never smoked  She has never used smokeless tobacco  She reports current alcohol use  She reports that she does not use drugs  SH:  Marofeliaied  Jory Modi is doing well  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  Sam  is  18  and at  [employer], studied math at [employer] for a year  Shea is 21, sophomore at [employer]   Playing club volleyball      Current Outpatient Medications   Medication Sig Dispense Refill    aspirin-acetaminophen-caffeine ([facility]) 250-250-65 MG per tablet Take by mouth      Calcium Carb-Cholecalciferol (CALCIUM 600-D PO) Take by mouth      eletriptan (RELPAX) 40 MG tablet Take one tablet at onset of migraine    One tablet 2 hours later PRN      Multiple Vitamin (MULTIVITAMIN) tablet Take 1 tablet by mouth daily      prednisoLONE acetate (PRED FORTE) 1 % ophthalmic suspension PUT 1 DROP INTO LEFT EYE 3 TIMES A DAY  0     No current facility-administered medications for this visit  Current Outpatient Medications on File Prior to Visit   Medication Sig    aspirin-acetaminophen-caffeine (EXCEDRIN MIGRAINE) 250-250-65 MG per tablet Take by mouth    Calcium Carb-Cholecalciferol (CALCIUM 600-D PO) Take by mouth    eletriptan (RELPAX) 40 MG tablet Take one tablet at onset of migraine    One tablet 2 hours later PRN    Multiple Vitamin (MULTIVITAMIN) tablet Take 1 tablet by mouth daily    prednisoLONE acetate (PRED FORTE) 1 % ophthalmic suspension PUT 1 DROP INTO LEFT EYE 3 TIMES A DAY     No current facility-administered medications on file prior to visit  She is allergic to other       Review of Systems   Constitutional: Negative for activity change, appetite change, chills, fatigue, fever and unexpected weight change  She lost 17 lb in the past year with exercise an intermittent fasting  HENT: Negative for congestion, rhinorrhea, sinus pressure, sore throat and trouble swallowing  Eyes: Negative for discharge, redness, itching and visual disturbance  Respiratory: Negative for cough, chest tightness, shortness of breath and wheezing  Cardiovascular: Negative for chest pain, palpitations and leg swelling  Gastrointestinal: Negative for abdominal distention, abdominal pain, blood in stool, constipation, diarrhea, nausea and vomiting  Genitourinary: Positive for dyspareunia   Negative for decreased urine volume, difficulty urinating, dysuria, frequency, hematuria, menstrual problem, pelvic pain, urgency, vaginal bleeding, vaginal discharge and vaginal pain  Musculoskeletal: Negative for arthralgias  Skin: Negative for rash  Neurological: Negative for weakness, light-headedness, numbness and headaches  Hematological: Does not bruise/bleed easily  Psychiatric/Behavioral: Negative for agitation, behavioral problems and sleep disturbance  The patient is not nervous/anxious  Objective:    Vitals:    01/25/21 0753   Height: 5' 10" (1 778 m)            Physical Exam  Vitals signs and nursing note reviewed  Constitutional:       Appearance: She is well-developed  HENT:      Head: Normocephalic and atraumatic  Eyes:      Extraocular Movements: Extraocular movements intact  Conjunctiva/sclera: Conjunctivae normal    Neck:      Musculoskeletal: Normal range of motion and neck supple  Thyroid: No thyromegaly  Trachea: No tracheal deviation  Cardiovascular:      Rate and Rhythm: Normal rate and regular rhythm  Heart sounds: Normal heart sounds  No murmur  Pulmonary:      Effort: Pulmonary effort is normal  No respiratory distress  Breath sounds: Normal breath sounds  No wheezing  Chest:      Breasts: Breasts are symmetrical          Right: No inverted nipple, mass, nipple discharge, skin change or tenderness  Left: No inverted nipple, mass, nipple discharge, skin change or tenderness  Abdominal:      General: Bowel sounds are normal  There is no distension  Palpations: Abdomen is soft  There is no mass  Tenderness: There is no abdominal tenderness  Genitourinary:     General: Normal vulva  Exam position: Supine  Labia:         Right: No rash, tenderness or lesion  Left: No rash, tenderness or lesion  Vagina: Normal       Cervix: No cervical motion tenderness or discharge  Uterus: Not deviated, not enlarged and not tender  Adnexa:         Right: No mass, tenderness or fullness  Left: No mass, tenderness or fullness  Rectum: No external hemorrhoid  Comments: Urethral meatus within normal limits  Perineum within normal limits  Bladder well supported  Physiologic vaginal atrophy  Musculoskeletal: Normal range of motion  Skin:     General: Skin is warm and dry  Neurological:      Mental Status: She is alert and oriented to person, place, and time  Psychiatric:         Behavior: Behavior normal          Thought Content:  Thought content normal          Judgment: Judgment normal

## 2021-01-25 ENCOUNTER — ANNUAL EXAM (OUTPATIENT)
Dept: GYNECOLOGY | Facility: CLINIC | Age: 51
End: 2021-01-25
Payer: COMMERCIAL

## 2021-01-25 VITALS — BODY MASS INDEX: 23.39 KG/M2 | HEIGHT: 70 IN

## 2021-01-25 DIAGNOSIS — Z86.19 HISTORY OF HERPES GENITALIS: ICD-10-CM

## 2021-01-25 DIAGNOSIS — Z12.31 ENCOUNTER FOR SCREENING MAMMOGRAM FOR MALIGNANT NEOPLASM OF BREAST: ICD-10-CM

## 2021-01-25 DIAGNOSIS — N95.0 POSTMENOPAUSAL BLEEDING: ICD-10-CM

## 2021-01-25 DIAGNOSIS — N94.10 DYSPAREUNIA IN FEMALE: ICD-10-CM

## 2021-01-25 DIAGNOSIS — N95.2 ATROPHIC VAGINITIS: ICD-10-CM

## 2021-01-25 DIAGNOSIS — M85.88 OSTEOPENIA OF LUMBAR SPINE: ICD-10-CM

## 2021-01-25 DIAGNOSIS — Z01.419 ENCOUNTER FOR ANNUAL ROUTINE GYNECOLOGICAL EXAMINATION: Primary | ICD-10-CM

## 2021-01-25 DIAGNOSIS — Z91.89 INCREASED RISK OF BREAST CANCER: ICD-10-CM

## 2021-01-25 PROCEDURE — 99396 PREV VISIT EST AGE 40-64: CPT | Performed by: OBSTETRICS & GYNECOLOGY

## 2021-02-26 ENCOUNTER — HOSPITAL ENCOUNTER (OUTPATIENT)
Dept: RADIOLOGY | Facility: MEDICAL CENTER | Age: 51
Discharge: HOME | End: 2021-02-26
Payer: COMMERCIAL

## 2021-02-26 DIAGNOSIS — N94.10 DYSPAREUNIA IN FEMALE: ICD-10-CM

## 2021-02-26 DIAGNOSIS — N95.0 POSTMENOPAUSAL BLEEDING: ICD-10-CM

## 2021-02-26 PROCEDURE — 76856 US EXAM PELVIC COMPLETE: CPT

## 2021-02-26 PROCEDURE — 76830 TRANSVAGINAL US NON-OB: CPT

## 2021-03-08 NOTE — RESULT ENCOUNTER NOTE
There is a 3 5 cm fibroid which should not be responsible for the bleeding you experienced  The lining of the uterus is 2 mm which is normal [<4]  This would be very reassuring except that there is a 1 mm cystic area within the lining  I think this could be followed with a repeat ultrasound in 6 months or we could try to do an endometrial biopsy now  However,  If you have a not other episode of bleeding in the future that would require a D&C in the hospital   If you would like to have an endometrial biopsy he would take 2 Advil 1 hour beforehand  If you prefer the ultrasound I can schedule that for 6 months just let me know  If you would like a more detailed discussion please come in for an appointment

## 2021-03-09 ENCOUNTER — TELEPHONE (OUTPATIENT)
Dept: OBGYN CLINIC | Facility: CLINIC | Age: 51
End: 2021-03-09

## 2021-03-09 NOTE — TELEPHONE ENCOUNTER
Called this patient to discuss pelvic u/s results  She has questions about a hormonal cream that was discussed with her at her prior visit  She would like a phone call from Dr Michael Licona because she has a lot of questions  Advised patient Dr Michael Licona is seeing patients today but  I will route a message to him to call her

## 2021-03-09 NOTE — TELEPHONE ENCOUNTER
----- Message from Janet Yates MD sent at 3/8/2021  6:35 PM EST -----  There is a 3 5 cm fibroid which should not be responsible for the bleeding you experienced  The lining of the uterus is 2 mm which is normal [<4]  This would be very reassuring except that there is a 1 mm cystic area within the lining  I think this could be followed with a repeat ultrasound in 6 months or we could try to do an endometrial biopsy now  However,  If you have a not other episode of bleeding in the future that would require a D&C in the hospital   If you would like to have an endometrial biopsy he would take 2 Advil 1 hour beforehand  If you prefer the ultrasound I can schedule that for 6 months just let me know  If you would like a more detailed discussion please come in for an appointment

## 2021-03-09 NOTE — TELEPHONE ENCOUNTER
If that is the case I would recommend a visit to discuss all her of her questions  These are outside the purview of a regular annual visit

## 2021-03-14 PROBLEM — N95.0 PMB (POSTMENOPAUSAL BLEEDING): Status: ACTIVE | Noted: 2021-03-14

## 2021-03-14 PROBLEM — N94.10 DYSPAREUNIA IN FEMALE: Status: ACTIVE | Noted: 2021-03-14

## 2021-03-14 NOTE — PROGRESS NOTES
Assessment/Plan:   endometrial biopsy will be performed in the near future for postmenopausal bleeding and the desire to begin vaginal estrogen therapy for dyspareunia  Due to atrophic vaginitis  Message given post scan: There is a 3 5 cm fibroid which should not be responsible for the bleeding you experienced   The lining of the uterus is 2 mm which is normal [<4]   This would be very reassuring except that there is a 1 mm cystic area within the lining   I think this could be followed with a repeat ultrasound in 6 months or we could try to do an endometrial biopsy now  Crow Garcia,  If you have another episode of bleeding in the future that would require a D&C in the hospital   If you would like to have an endometrial biopsy you would take 2 Advil 1 hour beforehand   If you prefer the ultrasound I can schedule that for 6 months just let me know   If you would like a more detailed discussion please come in for an appointment      today's visit lasted 18 minutes and was all counseling with regards to the above  Diagnoses and all orders for this visit:    PMB (postmenopausal bleeding)    Dyspareunia in female    Abnormal ultrasound of uterus    Intramural leiomyoma of uterus              Subjective:        Patient ID: Renita Gardner is a 44 y o  female  Susan presents today for a discussion of her recent ultrasound and for treatment of atrophic vaginitis with dyspareunia  She was able to view the ultrasound report and was upset upon seeing the word "large" describing a 3 5  Cm fibroid  Unfortunately she had a 2nd episode of postmenopausal bleeding today we called her about the ultrasound report  She describes painless light bleeding on 1 occasion which soiled her underwear  We reviewed the ultrasound report and discussed vaginal estrogen therapy  But in light of the 2nd episode of vaginal bleeding the fact that she desires vaginal estrogen therapy  I am recommending an endometrial biopsy    Vaginal estrogen therapy was fully explained including class labeling  I am no longer recommending repeating an ultrasound in 6 months  She was also made aware that if she has a recurrence of vaginal bleeding after the endometrial biopsy a formal hysteroscopy with D&C in the would be required  The endometrial biopsy was explained including the benefits and risks  The risks include infection,  Pain, uterine perforation, inadequate results and the need for further investigation  A copy of the ultrasound report was given to the patient  The following portions of the patient's history were reviewed and updated as appropriate: She  has a past medical history of Stress fracture of foot  Patient Active Problem List    Diagnosis Date Noted    PMB (postmenopausal bleeding) 03/14/2021    Dyspareunia in female 03/14/2021    History of herpes genitalis 10/31/2018    Encounter for annual routine gynecological examination 10/30/2018    Encounter for screening mammogram for malignant neoplasm of breast 10/30/2018     She  has a past surgical history that includes Breast lumpectomy; US guided breast biopsy right complete (Right, 6/11/2018); US guidance breast biopsy right each additional (Right, 6/11/2018); and Breast biopsy (Right, 06/11/2018)  Her family history includes BRCA1 Negative in her sister; BRCA2 Negative in her sister; Breast cancer (age of onset: 37) in her sister; Heart attack in her father and paternal grandfather; No Known Problems in her daughter, maternal aunt, mother, paternal aunt, sister, and son  She  reports that she has never smoked  She has never used smokeless tobacco  She reports current alcohol use  She reports that she does not use drugs    Current Outpatient Medications   Medication Sig Dispense Refill    aspirin-acetaminophen-caffeine (EXCEDRIN MIGRAINE) 250-250-65 MG per tablet Take by mouth      Calcium Carb-Cholecalciferol (CALCIUM 600-D PO) Take by mouth      eletriptan (RELPAX) 40 MG tablet Take one tablet at onset of migraine    One tablet 2 hours later PRN      Multiple Vitamin (MULTIVITAMIN) tablet Take 1 tablet by mouth daily      prednisoLONE acetate (PRED FORTE) 1 % ophthalmic suspension PUT 1 DROP INTO LEFT EYE 3 TIMES A DAY  0     No current facility-administered medications for this visit  Current Outpatient Medications on File Prior to Visit   Medication Sig    aspirin-acetaminophen-caffeine (EXCEDRIN MIGRAINE) 250-250-65 MG per tablet Take by mouth    Calcium Carb-Cholecalciferol (CALCIUM 600-D PO) Take by mouth    eletriptan (RELPAX) 40 MG tablet Take one tablet at onset of migraine    One tablet 2 hours later PRN    Multiple Vitamin (MULTIVITAMIN) tablet Take 1 tablet by mouth daily    prednisoLONE acetate (PRED FORTE) 1 % ophthalmic suspension PUT 1 DROP INTO LEFT EYE 3 TIMES A DAY     No current facility-administered medications on file prior to visit  She is allergic to other       Review of Systems      Objective:    Vitals:    03/15/21 0845   BP: 112/64   Weight: 80 2 kg (176 lb 12 8 oz)            Physical Exam

## 2021-03-15 ENCOUNTER — OFFICE VISIT (OUTPATIENT)
Dept: OBGYN CLINIC | Facility: CLINIC | Age: 51
End: 2021-03-15
Payer: COMMERCIAL

## 2021-03-15 VITALS — DIASTOLIC BLOOD PRESSURE: 64 MMHG | SYSTOLIC BLOOD PRESSURE: 112 MMHG | BODY MASS INDEX: 25.37 KG/M2 | WEIGHT: 176.8 LBS

## 2021-03-15 DIAGNOSIS — D25.1 INTRAMURAL LEIOMYOMA OF UTERUS: ICD-10-CM

## 2021-03-15 DIAGNOSIS — R93.5 ABNORMAL ULTRASOUND OF UTERUS: ICD-10-CM

## 2021-03-15 DIAGNOSIS — N94.10 DYSPAREUNIA IN FEMALE: ICD-10-CM

## 2021-03-15 DIAGNOSIS — N95.0 PMB (POSTMENOPAUSAL BLEEDING): Primary | ICD-10-CM

## 2021-03-15 PROCEDURE — 99213 OFFICE O/P EST LOW 20 MIN: CPT | Performed by: OBSTETRICS & GYNECOLOGY

## 2021-03-19 PROCEDURE — 58100 BIOPSY OF UTERUS LINING: CPT | Performed by: OBSTETRICS & GYNECOLOGY

## 2021-03-19 NOTE — PROGRESS NOTES
Assessment/Plan:  Failed attempted endometrial biopsy due to cervical stenosis  Although the risk of adenocarcinoma uterus is small I do not feel comfortable prescribing Premarin [ her formulary] at this time for dyspareunia and atrophic vaginitis  In order to proceed with vaginal estrogen I would recommend hospital based hysteroscopy with D&C and this was fully explained including the benefits and risks  The risk include anesthesia, infection, uterine perforation, need for laparoscopy,  Inadequate sampling and allergic reaction to medications  The previous option of repeating an ultrasound in 6 months is still viable  The endometrial stripe of 2 mm was very reassuring  The issue was the 1 mm cystic area  The patient will discuss the situation with her   If she changes her mind and prefers to be more aggressive we will schedule the procedure within the next 28 days  An order was placed for the ultrasound  If there is no change in 6 months then I would prescribe vaginal estrogen  today's visit lasted 11 minutes beyond the endometrial biopsy attempt at all this was counseling regarding the proposed surgery and the conditions needing to be addressed  Diagnoses and all orders for this visit:    PMB (postmenopausal bleeding)  -     Endometrial biopsy  -     Cancel: Tissue Exam  -     US pelvis complete non OB; Future    Cervical stenosis (uterine cervix)  -     US pelvis complete non OB; Future    Abnormal ultrasound of uterus  -     Endometrial biopsy  -     US pelvis complete non OB; Future    Dyspareunia in female  -     US pelvis complete non OB; Future    Intramural leiomyoma of uterus  -     US pelvis complete non OB; Future    Atrophic vaginitis              Subjective:        Patient ID: Melissa May is a 44 y o  female  Susan presents today for the endometrial biopsy  She was able to call her insurance company and found out that Premarin vaginal cream was on formulary    The proper use was re-explained-  Twice weekly but she does have the option of using it every other day for the 1st month to jump-start the vaginal therapy  She has not had any more episodes of bleeding  The following portions of the patient's history were reviewed and updated as appropriate: She  has a past medical history of Stress fracture of foot  Patient Active Problem List    Diagnosis Date Noted    Abnormal ultrasound of uterus 03/22/2021    Intramural leiomyoma of uterus 03/22/2021    PMB (postmenopausal bleeding) 03/14/2021    Dyspareunia in female 03/14/2021    History of herpes genitalis 10/31/2018    Encounter for annual routine gynecological examination 10/30/2018    Encounter for screening mammogram for malignant neoplasm of breast 10/30/2018     She  has a past surgical history that includes Breast lumpectomy; US guided breast biopsy right complete (Right, 6/11/2018); US guidance breast biopsy right each additional (Right, 6/11/2018); and Breast biopsy (Right, 06/11/2018)  Her family history includes BRCA1 Negative in her sister; BRCA2 Negative in her sister; Breast cancer (age of onset: 37) in her sister; Heart attack in her father and paternal grandfather; No Known Problems in her daughter, maternal aunt, mother, paternal aunt, sister, and son  She  reports that she has never smoked  She has never used smokeless tobacco  She reports current alcohol use  She reports that she does not use drugs  Current Outpatient Medications   Medication Sig Dispense Refill    aspirin-acetaminophen-caffeine (EXCEDRIN MIGRAINE) 250-250-65 MG per tablet Take by mouth      Calcium Carb-Cholecalciferol (CALCIUM 600-D PO) Take by mouth      eletriptan (RELPAX) 40 MG tablet Take one tablet at onset of migraine    One tablet 2 hours later PRN      Multiple Vitamin (MULTIVITAMIN) tablet Take 1 tablet by mouth daily      prednisoLONE acetate (PRED FORTE) 1 % ophthalmic suspension PUT 1 DROP INTO LEFT EYE 3 TIMES A DAY  0     No current facility-administered medications for this visit  Current Outpatient Medications on File Prior to Visit   Medication Sig    aspirin-acetaminophen-caffeine (EXCEDRIN MIGRAINE) 250-250-65 MG per tablet Take by mouth    Calcium Carb-Cholecalciferol (CALCIUM 600-D PO) Take by mouth    eletriptan (RELPAX) 40 MG tablet Take one tablet at onset of migraine    One tablet 2 hours later PRN    Multiple Vitamin (MULTIVITAMIN) tablet Take 1 tablet by mouth daily    prednisoLONE acetate (PRED FORTE) 1 % ophthalmic suspension PUT 1 DROP INTO LEFT EYE 3 TIMES A DAY     No current facility-administered medications on file prior to visit  She is allergic to other       Review of Systems   Constitutional: Negative  Respiratory: Negative for shortness of breath  Cardiovascular: Negative for chest pain  Gastrointestinal: Negative for abdominal pain  Genitourinary: Positive for dyspareunia  Negative for vaginal bleeding  Objective:    Vitals:    03/22/21 0824   BP: 114/56   Weight: 79 7 kg (175 lb 9 6 oz)            Physical Exam  Constitutional:       General: She is not in acute distress  Appearance: Normal appearance  HENT:      Head: Normocephalic and atraumatic  Eyes:      General: No scleral icterus  Right eye: No discharge  Left eye: No discharge  Extraocular Movements: Extraocular movements intact  Pulmonary:      Effort: Pulmonary effort is normal  No respiratory distress  Genitourinary:     General: Normal vulva  Comments: There was physiologic vaginal atrophy present  The cervical os is narrow  Skin:     General: Skin is warm and dry  Neurological:      Mental Status: She is alert and oriented to person, place, and time  Psychiatric:         Mood and Affect: Mood normal          Behavior: Behavior normal          Thought Content:  Thought content normal          Judgment: Judgment normal            Endometrial biopsy    Date/Time: 3/19/2021 5:38 PM  Performed by: Toñito Romeo MD  Authorized by: Toñito Romeo MD   Universal Protocol:  Consent: Verbal consent obtained  Written consent obtained  Consent given by: patient  Patient understanding: patient states understanding of the procedure being performed  Patient consent: the patient's understanding of the procedure matches consent given  Procedure consent: procedure consent matches procedure scheduled  Relevant documents: relevant documents present and verified  Patient identity confirmed: verbally with patient      Indication:     Indications: Post-menopausal bleeding      Chronicity of post-menopausal bleeding:  New    Progression of post-menopausal bleeding:  Unchanged  Pre-procedure:     Premeds:  Ibuprofen  Procedure:     Procedure: endometrial biopsy with Pipelle      A bivalve speculum was placed in the vagina: yes      The cervix was dilated: yes      Unable to perform due to: cervical stenosis    Findings:     Uterus size:  Non-gravid  Comments:     Procedure comments: The cervix was cleansed with Betadine solution  The Pipelle was attempted to be passed but this failed  The cervix was grasped using the tenaculum  An attempt at dilating the cervix using the plastic cervical dilator failed  Several attempts using a metal dilator also failed  Patient tolerated this very well and permitted me to be a little more forceful than usual   Despite being as meticulous and cautious as possible the cervical stenosis could not be overcome  The tenaculum was removed    These maneuvers were well tolerated by the patient and we were able to discuss options going forward

## 2021-03-22 ENCOUNTER — TELEPHONE (OUTPATIENT)
Dept: OBGYN CLINIC | Facility: CLINIC | Age: 51
End: 2021-03-22

## 2021-03-22 ENCOUNTER — PROCEDURE VISIT (OUTPATIENT)
Dept: OBGYN CLINIC | Facility: CLINIC | Age: 51
End: 2021-03-22
Payer: COMMERCIAL

## 2021-03-22 VITALS — WEIGHT: 175.6 LBS | BODY MASS INDEX: 25.2 KG/M2 | DIASTOLIC BLOOD PRESSURE: 56 MMHG | SYSTOLIC BLOOD PRESSURE: 114 MMHG

## 2021-03-22 DIAGNOSIS — N94.10 DYSPAREUNIA IN FEMALE: ICD-10-CM

## 2021-03-22 DIAGNOSIS — N88.2 CERVICAL STENOSIS (UTERINE CERVIX): ICD-10-CM

## 2021-03-22 DIAGNOSIS — R93.5 ABNORMAL ULTRASOUND OF UTERUS: ICD-10-CM

## 2021-03-22 DIAGNOSIS — N95.2 ATROPHIC VAGINITIS: ICD-10-CM

## 2021-03-22 DIAGNOSIS — N95.0 PMB (POSTMENOPAUSAL BLEEDING): Primary | ICD-10-CM

## 2021-03-22 DIAGNOSIS — D25.1 INTRAMURAL LEIOMYOMA OF UTERUS: ICD-10-CM

## 2021-03-22 PROCEDURE — 99212 OFFICE O/P EST SF 10 MIN: CPT | Performed by: OBSTETRICS & GYNECOLOGY

## 2021-04-08 ENCOUNTER — TELEPHONE (OUTPATIENT)
Dept: OBGYN CLINIC | Facility: CLINIC | Age: 51
End: 2021-04-08

## 2021-04-13 DIAGNOSIS — Z23 ENCOUNTER FOR IMMUNIZATION: ICD-10-CM

## 2021-06-10 ENCOUNTER — TELEPHONE (OUTPATIENT)
Dept: OBGYN CLINIC | Facility: CLINIC | Age: 51
End: 2021-06-10

## 2021-06-10 NOTE — TELEPHONE ENCOUNTER
Patient called because she is having an MRI of her breast on Saturday morning  She wanted to know if it was safe for her to have a few alcoholic drinks the night before  I viewed the instructions and told her there was nothing stating that she couldn't have any alcoholic drinks  Gave her number for central scheduling so she can call them and ask as a precaution  She was fine with the information I gave her and verbalized understanding

## 2021-06-12 ENCOUNTER — HOSPITAL ENCOUNTER (OUTPATIENT)
Dept: RADIOLOGY | Facility: HOSPITAL | Age: 51
Discharge: HOME | End: 2021-06-12
Attending: OBSTETRICS & GYNECOLOGY
Payer: COMMERCIAL

## 2021-06-12 DIAGNOSIS — Z91.89 INCREASED RISK OF BREAST CANCER: ICD-10-CM

## 2021-06-12 PROCEDURE — G1004 CDSM NDSC: HCPCS

## 2021-06-12 PROCEDURE — A9585 GADOBUTROL INJECTION: HCPCS | Performed by: OBSTETRICS & GYNECOLOGY

## 2021-06-12 PROCEDURE — C8908 MRI W/O FOL W/CONT, BREAST,: HCPCS

## 2021-06-12 RX ADMIN — GADOBUTROL 8 ML: 604.72 INJECTION INTRAVENOUS at 09:20

## 2021-09-02 ENCOUNTER — TELEPHONE (OUTPATIENT)
Dept: OBGYN CLINIC | Facility: CLINIC | Age: 51
End: 2021-09-02

## 2021-09-02 PROBLEM — N95.2 ATROPHIC VAGINITIS: Status: ACTIVE | Noted: 2021-09-02

## 2021-09-02 PROBLEM — N88.2 CERVICAL STENOSIS (UTERINE CERVIX): Status: ACTIVE | Noted: 2021-09-02

## 2021-09-02 NOTE — PROGRESS NOTES
Assessment/Plan:  Hysteroscopy with Fractional D&C 9/10  And any other necessary procedures    Recurrent PMB  Cervical Stenosis  Abnormal US of the Uterus  AV       2/26/21  UTERUS:  The uterus is anteverted in position, measuring 6 9 x 3 6 x 6 5 cm  Heterogeneous myometrium  Large intramural fundal fibroid on the right measuring 3 5 x 3 1 x 2 6 cm  The cervix shows nabothian cysts      ENDOMETRIUM:    Normal caliber of 2 mm  Tiny 1 mm cystic foci in the endometrium  [address], D&C:    Surgery requires general anesthesia  Risks include anesthesia, infection, uterine perforation, surgery to evaluate for uterine perforation, DVTs, paresthesias from stirrups, allergic reaction to medications, thermal injury and technical difficulties  Certain risk factors can increase the overall risk when either medication or surgery is performed  Obesity and cigarette smoking are examples  Diagnoses and all orders for this visit:    PMB (postmenopausal bleeding)    Cervical stenosis (uterine cervix)    Atrophic vaginitis    Abnormal ultrasound of uterus    Intramural leiomyoma of uterus              Subjective:        Patient ID: Eloy Godoy is a 46 y o  female  Ariadne Bustamante presents today after a 2nd episode of postmenopausal bleeding  She noted spotting on August 4th  On September 1st to the present she noted  Dark red bleeding with cramping  It is light in volume  The 1st episode was in October of 2020  It lasted 1 day and was painless  It occurred on the day she had a mammogram and she thought there was a cause and effect of this  I was not aware of this until her annual visit in January  She also was complaining of painful intercourse and had visible vaginal atrophy  An ultrasound was performed which revealed a 3 5 cm uterine fibroid which was not felt to be related to the bleeding and a 2 mm endometrial stripe along with a 1 mm cystic area within the endometrium   Because the risk of malignancy was low I gave her 2 options- repeat ultrasound in 6 months or endometrial biopsy  After she desired vaginal estrogen for dyspareunia and vaginal atrophy I specifically recommended an endometrial biopsy  I did not feel comfortable prescribing this without a more thorough evaluation  The endometrial biopsy was attempted in March  It could not be accomplished because of cervical stenosis  At that point I was not going to prescribe vaginal estrogen unless a hysteroscopy with D&C would be performed in the hospital  If she did not feel she needed the estrogen the option of waiting the 6 months for a repeat ultrasound was still valid  She is hoping [facility]  will allow her to begin vaginal estrogen therapy for dyspareunia  The following portions of the patient's history were reviewed and updated as appropriate: She  has a past medical history of Stress fracture of foot  Patient Active Problem List    Diagnosis Date Noted    Cervical stenosis (uterine cervix) 2021    Atrophic vaginitis 2021    Abnormal ultrasound of uterus 2021    Intramural leiomyoma of uterus 2021    PMB (postmenopausal bleeding) 2021    Dyspareunia in female 2021    History of herpes genitalis 10/31/2018    Encounter for annual routine gynecological examination 10/30/2018    Encounter for screening mammogram for malignant neoplasm of breast 10/30/2018   PMH:  Analilia Leal; C/S Breech 28 2/, Marginal Abruption, PML- Gladys Brood 3# 1 oz  3/00;  27 wks, 30% Abruption/PML  Shea 2-11, both had 1st trimester bleeding  LTL   Chronic Constipation/ Rectal fissures  Hemorrhoidectomy '  Migraines  HSV 1&2 '  AUB 10/15 EMB, sounded 7 cm, proliferative      R Foot- 2 Stress Fx's - power walking      R Foot -3rd Stress Fx  ;  BMD 10/18      R Breast Bx (2)       L Ripped Retina  laser      PMB 10/20      Dyspareunia '      PMB 10/20  She  has a past surgical history that includes Breast lumpectomy; US guided breast biopsy right complete (Right, 6/11/2018); US guidance breast biopsy right each additional (Right, 6/11/2018); and Breast biopsy (Right, 06/11/2018)  Her family history includes BRCA1 Negative in her sister; BRCA2 Negative in her sister; Breast cancer (age of onset: 37) in her sister; Heart attack in her father and paternal grandfather; No Known Problems in her daughter, maternal aunt, mother, paternal aunt, sister, and son  FH:  S- R breast cancer 41- double mastectomy with chemotherapy and radiation 40, BRCA neg  MC's- Breast Ca 53, 26  MGGM- Breast Ca  F d 44 MI  PGF- d 46 MI   She  reports that she has never smoked  She has never used smokeless tobacco  She reports current alcohol use  She reports that she does not use drugs  SH:  Sarah Shaffer is doing well  Susan works PT but not in [address] [place] at  [employer], [employer] at [employer] for a year  Shea is 21, sophomore at [employer]  Current Outpatient Medications   Medication Sig Dispense Refill    aspirin-acetaminophen-caffeine (EXCEDRIN MIGRAINE) 250-250-65 MG per tablet Take by mouth      Calcium Carb-Cholecalciferol (CALCIUM 600-D PO) Take by mouth      eletriptan (RELPAX) 40 MG tablet Take one tablet at onset of migraine    One tablet 2 hours later PRN      Multiple Vitamin (MULTIVITAMIN) tablet Take 1 tablet by mouth daily      prednisoLONE acetate (PRED FORTE) 1 % ophthalmic suspension PUT 1 DROP INTO LEFT EYE 3 TIMES A DAY  0     No current facility-administered medications for this visit  Current Outpatient Medications on File Prior to Visit   Medication Sig    aspirin-acetaminophen-caffeine (EXCEDRIN MIGRAINE) 250-250-65 MG per tablet Take by mouth    Calcium Carb-Cholecalciferol (CALCIUM 600-D PO) Take by mouth    eletriptan (RELPAX) 40 MG tablet Take one tablet at onset of migraine    One tablet 2 hours later PRN    Multiple Vitamin (MULTIVITAMIN) tablet Take 1 tablet by mouth daily    prednisoLONE acetate (PRED FORTE) 1 % ophthalmic suspension PUT 1 DROP INTO LEFT EYE 3 TIMES A DAY     No current facility-administered medications on file prior to visit  She is allergic to other       Review of Systems   Constitutional: Negative for activity change, appetite change, chills, fatigue, fever and unexpected weight change  She lost 17 lb in the past year with exercise an intermittent fasting  HENT: Negative for congestion, rhinorrhea, sinus pressure, sore throat and trouble swallowing  Eyes: Negative for discharge, redness, itching and visual disturbance  Respiratory: Negative for cough, chest tightness, shortness of breath and wheezing  Cardiovascular: Negative for chest pain, palpitations and leg swelling  Gastrointestinal: Negative for abdominal distention, abdominal pain, blood in stool, constipation, diarrhea, nausea and vomiting  Genitourinary: Positive for dyspareunia  Negative for decreased urine volume, difficulty urinating, dysuria, frequency, hematuria, menstrual problem, pelvic pain, urgency, vaginal bleeding, vaginal discharge and vaginal pain  Musculoskeletal: Negative for arthralgias  Skin: Negative for rash  Neurological: Negative for weakness, light-headedness, numbness and headaches  Hematological: Does not bruise/bleed easily  Psychiatric/Behavioral: Negative for agitation, behavioral problems and sleep disturbance  The patient is not nervous/anxious  Objective:    Vitals:    09/03/21 0802   BP: 100/60   Weight: 80 6 kg (177 lb 9 6 oz)   Height: 5' 10" (1 778 m)            Physical Exam  Vitals and nursing note reviewed  Exam conducted with a chaperone present  Constitutional:       Appearance: Normal appearance  She is normal weight  Eyes:      General: No scleral icterus  Right eye: No discharge  Left eye: No discharge  Extraocular Movements: Extraocular movements intact  Cardiovascular:      Rate and Rhythm: Normal rate and regular rhythm  Heart sounds: Normal heart sounds  No murmur heard  Pulmonary:      Effort: Pulmonary effort is normal  No respiratory distress  Breath sounds: Normal breath sounds  Abdominal:      General: Abdomen is flat  There is no distension  Palpations: Abdomen is soft  There is no mass  Tenderness: There is no abdominal tenderness  There is no right CVA tenderness, left CVA tenderness or guarding  Genitourinary:     General: Normal vulva  Comments:  Scant bleeding  The cervix is closed and stenotic  The uterus is normal size anteverted and mobile  There are no adnexal masses appreciated  Musculoskeletal:         General: No swelling or tenderness  Cervical back: Normal range of motion and neck supple  No rigidity or tenderness  Right lower leg: No edema  Left lower leg: No edema  Lymphadenopathy:      Cervical: No cervical adenopathy  Skin:     General: Skin is warm and dry  Neurological:      Mental Status: She is alert and oriented to person, place, and time  Psychiatric:         Mood and Affect: Mood normal          Behavior: Behavior normal          Thought Content:  Thought content normal          Judgment: Judgment normal

## 2021-09-02 NOTE — TELEPHONE ENCOUNTER
I believe the endometrial stripe was 2 mm  Since this is her 2nd episode of postmenopausal bleeding she needs a hysteroscopy D&C

## 2021-09-02 NOTE — TELEPHONE ENCOUNTER
Had  bleeding 3/2 Failed attempt at EB  U/S had 2 cm endometrium  Declined surgery to look  Spotting 8/4  Bled (dark brown) last night and today  Not filling a pad  Someone gave her an appt with you tomorrow  Want anything prior?  Thx

## 2021-09-02 NOTE — TELEPHONE ENCOUNTER
Pt called saying her bleeding came back, she also have cramps and staying it started like at 5:00 pm    I scheduled her to see Dr Marciano Luis for tomorrow but she also prefer if someone can call her today  Please advise!

## 2021-09-02 NOTE — H&P (VIEW-ONLY)
Assessment/Plan:  Hysteroscopy with Fractional D&C 9/10  And any other necessary procedures    Recurrent PMB  Cervical Stenosis  Abnormal US of the Uterus  AV       2/26/21  UTERUS:  The uterus is anteverted in position, measuring 6 9 x 3 6 x 6 5 cm  Heterogeneous myometrium  Large intramural fundal fibroid on the right measuring 3 5 x 3 1 x 2 6 cm  The cervix shows nabothian cysts      ENDOMETRIUM:    Normal caliber of 2 mm  Tiny 1 mm cystic foci in the endometrium  [address], D&C:    Surgery requires general anesthesia  Risks include anesthesia, infection, uterine perforation, surgery to evaluate for uterine perforation, DVTs, paresthesias from stirrups, allergic reaction to medications, thermal injury and technical difficulties  Certain risk factors can increase the overall risk when either medication or surgery is performed  Obesity and cigarette smoking are examples  Diagnoses and all orders for this visit:    PMB (postmenopausal bleeding)    Cervical stenosis (uterine cervix)    Atrophic vaginitis    Abnormal ultrasound of uterus    Intramural leiomyoma of uterus              Subjective:        Patient ID: Kel Meyer is a 46 y o  female  Kristine Aguilar presents today after a 2nd episode of postmenopausal bleeding  She noted spotting on August 4th  On September 1st to the present she noted  Dark red bleeding with cramping  It is light in volume  The 1st episode was in October of 2020  It lasted 1 day and was painless  It occurred on the day she had a mammogram and she thought there was a cause and effect of this  I was not aware of this until her annual visit in January  She also was complaining of painful intercourse and had visible vaginal atrophy  An ultrasound was performed which revealed a 3 5 cm uterine fibroid which was not felt to be related to the bleeding and a 2 mm endometrial stripe along with a 1 mm cystic area within the endometrium   Because the risk of malignancy was low I gave her 2 options- repeat ultrasound in 6 months or endometrial biopsy  After she desired vaginal estrogen for dyspareunia and vaginal atrophy I specifically recommended an endometrial biopsy  I did not feel comfortable prescribing this without a more thorough evaluation  The endometrial biopsy was attempted in March  It could not be accomplished because of cervical stenosis  At that point I was not going to prescribe vaginal estrogen unless a hysteroscopy with D&C would be performed in the hospital  If she did not feel she needed the estrogen the option of waiting the 6 months for a repeat ultrasound was still valid  She is hoping the [facility]  will allow her to begin vaginal estrogen therapy for dyspareunia  The following portions of the patient's history were reviewed and updated as appropriate: She  has a past medical history of Stress fracture of foot  Patient Active Problem List    Diagnosis Date Noted    Cervical stenosis (uterine cervix) 2021    Atrophic vaginitis 2021    Abnormal ultrasound of uterus 2021    Intramural leiomyoma of uterus 2021    PMB (postmenopausal bleeding) 2021    Dyspareunia in female 2021    History of herpes genitalis 10/31/2018    Encounter for annual routine gynecological examination 10/30/2018    Encounter for screening mammogram for malignant neoplasm of breast 10/30/2018   PMH:  Justice Peabody; C/S Breech 28 2/, Marginal Abruption, PML- Vonna Kras 3# 1 oz  3/00;  27 wks, 30% Abruption/PML  Shea 2-11, both had 1st trimester bleeding  LTL   Chronic Constipation/ Rectal fissures  Hemorrhoidectomy '  Migraines  HSV 1&2 '  AUB 10/15 EMB, sounded 7 cm, proliferative      R Foot- 2 Stress Fx's - power walking      R Foot -3rd Stress Fx  ;  BMD 10/18      R Breast Bx (2)       L Ripped Retina  laser      PMB 10/20      Dyspareunia '      PMB 10/20  She  has a past surgical history that includes Breast lumpectomy; US guided breast biopsy right complete (Right, 6/11/2018); US guidance breast biopsy right each additional (Right, 6/11/2018); and Breast biopsy (Right, 06/11/2018)  Her family history includes BRCA1 Negative in her sister; BRCA2 Negative in her sister; Breast cancer (age of onset: 37) in her sister; Heart attack in her father and paternal grandfather; No Known Problems in her daughter, maternal aunt, mother, paternal aunt, sister, and son  FH:  S- R breast cancer 41- double mastectomy with chemotherapy and radiation 40, BRCA neg  MC's- Breast Ca 53, 26  MGGM- Breast Ca  F d 44 MI  PGF- d 44 MI   She  reports that she has never smoked  She has never used smokeless tobacco  She reports current alcohol use  She reports that she does not use drugs  SH:  Marmoody Mi is doing well  Susan works PT but not in [address] [place] at  [facility], [address] at [facility] for a year  Shea is 21, sophomore at [employer]  Current Outpatient Medications   Medication Sig Dispense Refill    aspirin-acetaminophen-caffeine (EXCEDRIN MIGRAINE) 250-250-65 MG per tablet Take by mouth      Calcium Carb-Cholecalciferol (CALCIUM 600-D PO) Take by mouth      eletriptan (RELPAX) 40 MG tablet Take one tablet at onset of migraine    One tablet 2 hours later PRN      Multiple Vitamin (MULTIVITAMIN) tablet Take 1 tablet by mouth daily      prednisoLONE acetate (PRED FORTE) 1 % ophthalmic suspension PUT 1 DROP INTO LEFT EYE 3 TIMES A DAY  0     No current facility-administered medications for this visit  Current Outpatient Medications on File Prior to Visit   Medication Sig    aspirin-acetaminophen-caffeine (EXCEDRIN MIGRAINE) 250-250-65 MG per tablet Take by mouth    Calcium Carb-Cholecalciferol (CALCIUM 600-D PO) Take by mouth    eletriptan (RELPAX) 40 MG tablet Take one tablet at onset of migraine    One tablet 2 hours later PRN    Multiple Vitamin (MULTIVITAMIN) tablet Take 1 tablet by mouth daily    prednisoLONE acetate (PRED FORTE) 1 % ophthalmic suspension PUT 1 DROP INTO LEFT EYE 3 TIMES A DAY     No current facility-administered medications on file prior to visit  She is allergic to other       Review of Systems   Constitutional: Negative for activity change, appetite change, chills, fatigue, fever and unexpected weight change  She lost 17 lb in the past year with exercise an intermittent fasting  HENT: Negative for congestion, rhinorrhea, sinus pressure, sore throat and trouble swallowing  Eyes: Negative for discharge, redness, itching and visual disturbance  Respiratory: Negative for cough, chest tightness, shortness of breath and wheezing  Cardiovascular: Negative for chest pain, palpitations and leg swelling  Gastrointestinal: Negative for abdominal distention, abdominal pain, blood in stool, constipation, diarrhea, nausea and vomiting  Genitourinary: Positive for dyspareunia  Negative for decreased urine volume, difficulty urinating, dysuria, frequency, hematuria, menstrual problem, pelvic pain, urgency, vaginal bleeding, vaginal discharge and vaginal pain  Musculoskeletal: Negative for arthralgias  Skin: Negative for rash  Neurological: Negative for weakness, light-headedness, numbness and headaches  Hematological: Does not bruise/bleed easily  Psychiatric/Behavioral: Negative for agitation, behavioral problems and sleep disturbance  The patient is not nervous/anxious  Objective:    Vitals:    09/03/21 0802   BP: 100/60   Weight: 80 6 kg (177 lb 9 6 oz)   Height: 5' 10" (1 778 m)            Physical Exam  Vitals and nursing note reviewed  Exam conducted with a chaperone present  Constitutional:       Appearance: Normal appearance  She is normal weight  Eyes:      General: No scleral icterus  Right eye: No discharge  Left eye: No discharge  Extraocular Movements: Extraocular movements intact  Cardiovascular:      Rate and Rhythm: Normal rate and regular rhythm  Heart sounds: Normal heart sounds  No murmur heard  Pulmonary:      Effort: Pulmonary effort is normal  No respiratory distress  Breath sounds: Normal breath sounds  Abdominal:      General: Abdomen is flat  There is no distension  Palpations: Abdomen is soft  There is no mass  Tenderness: There is no abdominal tenderness  There is no right CVA tenderness, left CVA tenderness or guarding  Genitourinary:     General: Normal vulva  Comments:  Scant bleeding  The cervix is closed and stenotic  The uterus is normal size anteverted and mobile  There are no adnexal masses appreciated  Musculoskeletal:         General: No swelling or tenderness  Cervical back: Normal range of motion and neck supple  No rigidity or tenderness  Right lower leg: No edema  Left lower leg: No edema  Lymphadenopathy:      Cervical: No cervical adenopathy  Skin:     General: Skin is warm and dry  Neurological:      Mental Status: She is alert and oriented to person, place, and time  Psychiatric:         Mood and Affect: Mood normal          Behavior: Behavior normal          Thought Content:  Thought content normal          Judgment: Judgment normal

## 2021-09-03 ENCOUNTER — OFFICE VISIT (OUTPATIENT)
Dept: OBGYN CLINIC | Facility: CLINIC | Age: 51
End: 2021-09-03
Payer: COMMERCIAL

## 2021-09-03 VITALS
BODY MASS INDEX: 25.43 KG/M2 | SYSTOLIC BLOOD PRESSURE: 100 MMHG | HEIGHT: 70 IN | WEIGHT: 177.6 LBS | DIASTOLIC BLOOD PRESSURE: 60 MMHG

## 2021-09-03 DIAGNOSIS — R93.5 ABNORMAL ULTRASOUND OF UTERUS: ICD-10-CM

## 2021-09-03 DIAGNOSIS — N95.2 ATROPHIC VAGINITIS: ICD-10-CM

## 2021-09-03 DIAGNOSIS — N95.0 PMB (POSTMENOPAUSAL BLEEDING): Primary | ICD-10-CM

## 2021-09-03 DIAGNOSIS — N88.2 CERVICAL STENOSIS (UTERINE CERVIX): ICD-10-CM

## 2021-09-03 DIAGNOSIS — D25.1 INTRAMURAL LEIOMYOMA OF UTERUS: ICD-10-CM

## 2021-09-03 PROCEDURE — 99214 OFFICE O/P EST MOD 30 MIN: CPT | Performed by: OBSTETRICS & GYNECOLOGY

## 2021-09-07 NOTE — PRE-PROCEDURE INSTRUCTIONS
Pre-Surgery Instructions:   Medication Instructions    aspirin-acetaminophen-caffeine (EXCEDRIN MIGRAINE) 992-910-29 MG per tablet Instructed to avoid all ASA/NSAIDs and OTC Vit/Supp from now until after surgery per anesthesia guidelines  Tylenol ok prn    Calcium Carb-Cholecalciferol (CALCIUM 600-D PO) Instructed to avoid all ASA/NSAIDs and OTC Vit/Supp from now until after surgery per anesthesia guidelines  Tylenol ok prn    eletriptan (RELPAX) 40 MG tablet Instructed to take per normal schedule    Multiple Vitamin (MULTIVITAMIN) tablet Instructed to avoid all ASA/NSAIDs and OTC Vit/Supp from now until after surgery per anesthesia guidelines  Tylenol ok prn    prednisoLONE acetate (PRED FORTE) 1 % ophthalmic suspension Instructed to take per normal schedule including DOS    Pre-op medication, and showering instructions with antibacteral soap reviewed  Instructed to avoid all ASA/NSAIDs and OTC Vit/Supp from now until after surgery per anesthesia guidelines  Tylenol ok prn  Pt  Verbalized an understanding of all instructions reviewed and offers no concerns at this time

## 2021-09-09 ENCOUNTER — TELEPHONE (OUTPATIENT)
Dept: OBGYN CLINIC | Facility: CLINIC | Age: 51
End: 2021-09-09

## 2021-09-09 ENCOUNTER — ANESTHESIA EVENT (OUTPATIENT)
Dept: PERIOP | Facility: HOSPITAL | Age: 51
End: 2021-09-09
Payer: COMMERCIAL

## 2021-09-09 NOTE — TELEPHONE ENCOUNTER
Called and spoke to pt informing she does not need any blood work done   Pt verbalized understanding

## 2021-09-09 NOTE — TELEPHONE ENCOUNTER
Corey Gillette,    This pt called informing she has surgery with you tomorrow  She said you told her she might need blood work done? But I didn't see anything in her chart  Please advise to clinical bin only, thank you

## 2021-09-10 ENCOUNTER — HOSPITAL ENCOUNTER (OUTPATIENT)
Facility: HOSPITAL | Age: 51
Setting detail: OUTPATIENT SURGERY
Discharge: HOME | End: 2021-09-10
Attending: OBSTETRICS & GYNECOLOGY | Admitting: OBSTETRICS & GYNECOLOGY
Payer: COMMERCIAL

## 2021-09-10 ENCOUNTER — ANESTHESIA (OUTPATIENT)
Dept: PERIOP | Facility: HOSPITAL | Age: 51
End: 2021-09-10
Payer: COMMERCIAL

## 2021-09-10 VITALS
OXYGEN SATURATION: 100 % | WEIGHT: 177 LBS | RESPIRATION RATE: 15 BRPM | DIASTOLIC BLOOD PRESSURE: 62 MMHG | HEART RATE: 60 BPM | TEMPERATURE: 97.5 F | BODY MASS INDEX: 25.34 KG/M2 | HEIGHT: 70 IN | SYSTOLIC BLOOD PRESSURE: 111 MMHG

## 2021-09-10 DIAGNOSIS — N95.0 POST-MENOPAUSAL BLEEDING: ICD-10-CM

## 2021-09-10 PROBLEM — Z98.890 STATUS POST D&C: Status: ACTIVE | Noted: 2021-09-10

## 2021-09-10 LAB
EXT PREGNANCY TEST URINE: NEGATIVE
EXT. CONTROL: NORMAL

## 2021-09-10 PROCEDURE — 88305 TISSUE EXAM BY PATHOLOGIST: CPT | Performed by: PATHOLOGY

## 2021-09-10 PROCEDURE — 81025 URINE PREGNANCY TEST: CPT | Performed by: OBSTETRICS & GYNECOLOGY

## 2021-09-10 PROCEDURE — 58558 HYSTEROSCOPY BIOPSY: CPT | Performed by: OBSTETRICS & GYNECOLOGY

## 2021-09-10 RX ORDER — FENTANYL CITRATE 50 UG/ML
INJECTION, SOLUTION INTRAMUSCULAR; INTRAVENOUS AS NEEDED
Status: DISCONTINUED | OUTPATIENT
Start: 2021-09-10 | End: 2021-09-10

## 2021-09-10 RX ORDER — MAGNESIUM HYDROXIDE 1200 MG/15ML
LIQUID ORAL AS NEEDED
Status: DISCONTINUED | OUTPATIENT
Start: 2021-09-10 | End: 2021-09-10 | Stop reason: HOSPADM

## 2021-09-10 RX ORDER — SODIUM CHLORIDE, SODIUM LACTATE, POTASSIUM CHLORIDE, CALCIUM CHLORIDE 600; 310; 30; 20 MG/100ML; MG/100ML; MG/100ML; MG/100ML
INJECTION, SOLUTION INTRAVENOUS CONTINUOUS PRN
Status: DISCONTINUED | OUTPATIENT
Start: 2021-09-10 | End: 2021-09-10

## 2021-09-10 RX ORDER — PROPOFOL 10 MG/ML
INJECTION, EMULSION INTRAVENOUS CONTINUOUS PRN
Status: DISCONTINUED | OUTPATIENT
Start: 2021-09-10 | End: 2021-09-10

## 2021-09-10 RX ORDER — ONDANSETRON 2 MG/ML
4 INJECTION INTRAMUSCULAR; INTRAVENOUS ONCE AS NEEDED
Status: DISCONTINUED | OUTPATIENT
Start: 2021-09-10 | End: 2021-09-10 | Stop reason: HOSPADM

## 2021-09-10 RX ORDER — HYDROMORPHONE HCL/PF 1 MG/ML
0.5 SYRINGE (ML) INJECTION
Status: DISCONTINUED | OUTPATIENT
Start: 2021-09-10 | End: 2021-09-10 | Stop reason: HOSPADM

## 2021-09-10 RX ORDER — ACETAMINOPHEN 325 MG/1
650 TABLET ORAL EVERY 6 HOURS PRN
Status: CANCELLED | OUTPATIENT
Start: 2021-09-10

## 2021-09-10 RX ORDER — ONDANSETRON 2 MG/ML
INJECTION INTRAMUSCULAR; INTRAVENOUS AS NEEDED
Status: DISCONTINUED | OUTPATIENT
Start: 2021-09-10 | End: 2021-09-10

## 2021-09-10 RX ORDER — ALBUTEROL SULFATE 2.5 MG/3ML
2.5 SOLUTION RESPIRATORY (INHALATION) ONCE AS NEEDED
Status: DISCONTINUED | OUTPATIENT
Start: 2021-09-10 | End: 2021-09-10 | Stop reason: HOSPADM

## 2021-09-10 RX ORDER — PROMETHAZINE HYDROCHLORIDE 25 MG/ML
12.5 INJECTION, SOLUTION INTRAMUSCULAR; INTRAVENOUS ONCE AS NEEDED
Status: DISCONTINUED | OUTPATIENT
Start: 2021-09-10 | End: 2021-09-10 | Stop reason: HOSPADM

## 2021-09-10 RX ORDER — OXYCODONE HYDROCHLORIDE 5 MG/1
5 TABLET ORAL EVERY 4 HOURS PRN
Status: CANCELLED | OUTPATIENT
Start: 2021-09-10

## 2021-09-10 RX ORDER — MEPERIDINE HYDROCHLORIDE 25 MG/ML
12.5 INJECTION INTRAMUSCULAR; INTRAVENOUS; SUBCUTANEOUS
Status: DISCONTINUED | OUTPATIENT
Start: 2021-09-10 | End: 2021-09-10 | Stop reason: HOSPADM

## 2021-09-10 RX ORDER — METOCLOPRAMIDE HYDROCHLORIDE 5 MG/ML
INJECTION INTRAMUSCULAR; INTRAVENOUS AS NEEDED
Status: DISCONTINUED | OUTPATIENT
Start: 2021-09-10 | End: 2021-09-10

## 2021-09-10 RX ORDER — LIDOCAINE HYDROCHLORIDE 10 MG/ML
INJECTION, SOLUTION EPIDURAL; INFILTRATION; INTRACAUDAL; PERINEURAL AS NEEDED
Status: DISCONTINUED | OUTPATIENT
Start: 2021-09-10 | End: 2021-09-10

## 2021-09-10 RX ORDER — EPHEDRINE SULFATE 50 MG/ML
INJECTION INTRAVENOUS AS NEEDED
Status: DISCONTINUED | OUTPATIENT
Start: 2021-09-10 | End: 2021-09-10

## 2021-09-10 RX ORDER — MIDAZOLAM HYDROCHLORIDE 2 MG/2ML
INJECTION, SOLUTION INTRAMUSCULAR; INTRAVENOUS AS NEEDED
Status: DISCONTINUED | OUTPATIENT
Start: 2021-09-10 | End: 2021-09-10

## 2021-09-10 RX ORDER — KETOROLAC TROMETHAMINE 30 MG/ML
INJECTION, SOLUTION INTRAMUSCULAR; INTRAVENOUS AS NEEDED
Status: DISCONTINUED | OUTPATIENT
Start: 2021-09-10 | End: 2021-09-10

## 2021-09-10 RX ORDER — SODIUM CHLORIDE, SODIUM LACTATE, POTASSIUM CHLORIDE, CALCIUM CHLORIDE 600; 310; 30; 20 MG/100ML; MG/100ML; MG/100ML; MG/100ML
125 INJECTION, SOLUTION INTRAVENOUS CONTINUOUS
Status: DISCONTINUED | OUTPATIENT
Start: 2021-09-10 | End: 2021-09-10 | Stop reason: HOSPADM

## 2021-09-10 RX ORDER — DEXAMETHASONE SODIUM PHOSPHATE 10 MG/ML
INJECTION, SOLUTION INTRAMUSCULAR; INTRAVENOUS AS NEEDED
Status: DISCONTINUED | OUTPATIENT
Start: 2021-09-10 | End: 2021-09-10

## 2021-09-10 RX ORDER — PROPOFOL 10 MG/ML
INJECTION, EMULSION INTRAVENOUS AS NEEDED
Status: DISCONTINUED | OUTPATIENT
Start: 2021-09-10 | End: 2021-09-10

## 2021-09-10 RX ORDER — FENTANYL CITRATE/PF 50 MCG/ML
25 SYRINGE (ML) INJECTION
Status: DISCONTINUED | OUTPATIENT
Start: 2021-09-10 | End: 2021-09-10 | Stop reason: HOSPADM

## 2021-09-10 RX ORDER — LIDOCAINE HYDROCHLORIDE 10 MG/ML
0.5 INJECTION, SOLUTION EPIDURAL; INFILTRATION; INTRACAUDAL; PERINEURAL ONCE AS NEEDED
Status: COMPLETED | OUTPATIENT
Start: 2021-09-10 | End: 2021-09-10

## 2021-09-10 RX ORDER — IBUPROFEN 400 MG/1
600 TABLET ORAL EVERY 6 HOURS PRN
Status: CANCELLED | OUTPATIENT
Start: 2021-09-10

## 2021-09-10 RX ADMIN — Medication 25 MCG: at 15:46

## 2021-09-10 RX ADMIN — MIDAZOLAM 2 MG: 1 INJECTION INTRAMUSCULAR; INTRAVENOUS at 14:30

## 2021-09-10 RX ADMIN — EPHEDRINE SULFATE 5 MG: 50 INJECTION, SOLUTION INTRAVENOUS at 15:00

## 2021-09-10 RX ADMIN — PROPOFOL 120 MG: 10 INJECTION, EMULSION INTRAVENOUS at 14:38

## 2021-09-10 RX ADMIN — KETOROLAC TROMETHAMINE 30 MG: 30 INJECTION, SOLUTION INTRAMUSCULAR at 15:21

## 2021-09-10 RX ADMIN — SODIUM CHLORIDE, SODIUM LACTATE, POTASSIUM CHLORIDE, AND CALCIUM CHLORIDE: .6; .31; .03; .02 INJECTION, SOLUTION INTRAVENOUS at 15:18

## 2021-09-10 RX ADMIN — EPHEDRINE SULFATE 5 MG: 50 INJECTION, SOLUTION INTRAVENOUS at 14:51

## 2021-09-10 RX ADMIN — FENTANYL CITRATE 100 MCG: 50 INJECTION INTRAMUSCULAR; INTRAVENOUS at 14:38

## 2021-09-10 RX ADMIN — LIDOCAINE HYDROCHLORIDE 1.3 ML: 10 INJECTION, SOLUTION EPIDURAL; INFILTRATION; INTRACAUDAL; PERINEURAL at 14:38

## 2021-09-10 RX ADMIN — DEXAMETHASONE SODIUM PHOSPHATE 10 MG: 10 INJECTION, SOLUTION INTRAMUSCULAR; INTRAVENOUS at 14:46

## 2021-09-10 RX ADMIN — Medication 25 MCG: at 15:56

## 2021-09-10 RX ADMIN — SODIUM CHLORIDE, SODIUM LACTATE, POTASSIUM CHLORIDE, AND CALCIUM CHLORIDE 125 ML/HR: .6; .31; .03; .02 INJECTION, SOLUTION INTRAVENOUS at 13:05

## 2021-09-10 RX ADMIN — ONDANSETRON 4 MG: 2 INJECTION INTRAMUSCULAR; INTRAVENOUS at 14:46

## 2021-09-10 RX ADMIN — LIDOCAINE HYDROCHLORIDE 0.2 ML: 10 INJECTION, SOLUTION EPIDURAL; INFILTRATION; INTRACAUDAL; PERINEURAL at 13:06

## 2021-09-10 RX ADMIN — SODIUM CHLORIDE, SODIUM LACTATE, POTASSIUM CHLORIDE, AND CALCIUM CHLORIDE: .6; .31; .03; .02 INJECTION, SOLUTION INTRAVENOUS at 14:36

## 2021-09-10 RX ADMIN — METOCLOPRAMIDE HYDROCHLORIDE 10 MG: 5 INJECTION INTRAMUSCULAR; INTRAVENOUS at 14:46

## 2021-09-10 RX ADMIN — PROPOFOL 30 MCG/KG/MIN: 10 INJECTION, EMULSION INTRAVENOUS at 14:43

## 2021-09-10 RX ADMIN — Medication 25 MCG: at 15:51

## 2021-09-10 NOTE — INTERVAL H&P NOTE
H&P reviewed  After examining the patient I find no changes in the patients condition since the H&P had been written  She has continued to bleed until today  Post op instr given  Advil or Tylenol prn pain      Vitals:    09/10/21 1250   BP: 124/79   Pulse: 59   Resp: 18   Temp: 98 °F (36 7 °C)   SpO2: 98%

## 2021-09-10 NOTE — DISCHARGE INSTRUCTIONS
Dilation and Curettage   WHAT YOU SHOULD KNOW:   Dilation and curettage (D and C) is a procedure to remove tissue from the lining of your uterus  INSTRUCTIONS:   Medicines:   · Pain medicine: You may be given medicine to take away or decrease pain  Do not wait until the pain is severe before you take your medicine  · Antibiotics: This medicine will help fight or prevent an infection  · Take your medicine as directed  Call your healthcare provider if you think your medicine is not helping or if you have side effects  Tell him if you are allergic to any medicine  Keep a list of the medicines, vitamins, and herbs you take  Include the amounts, and when and why you take them  Bring the list or the pill bottles to follow-up visits  Carry your medicine list with you in case of an emergency  Follow up with your healthcare provider as directed:  Write down your questions so you remember to ask them during your visits  Activity:  Ask your primary healthcare provider when you can return to your normal activities  Contact your primary healthcare provider if:   · You have foul-smelling vaginal discharge  · You do not get your monthly period  · You feel depressed or anxious  · You feel very tired and weak  · You have questions or concerns about your condition or care  Return to the emergency department if:   · You have heavy vaginal bleeding that soaks 1 pad in 1 hour for 2 hours in a row  · You have a fever and abdominal cramps  · Your pain does not get better, even after treatment  © 2014 [address] [employer] is for End User's use only and may not be sold, redistributed or otherwise used for commercial purposes  All illustrations and images included in CareNotes® are the copyrighted property of A D A M , Inc  or Daquan Garcia  The above information is an  only  It is not intended as medical advice for individual conditions or treatments   Talk to your doctor, nurse or pharmacist before following any medical regimen to see if it is safe and effective for you  Postmenopausal Bleeding   WHAT YOU NEED TO KNOW:   What do I need to know about postmenopausal bleeding? Postmenopausal bleeding is bleeding that occurs after menopause  A woman who has not had a period for a full year after the age of 36 is considered to be in menopause  Postmenopausal bleeding may range from spotting to very heavy bleeding  What causes postmenopausal bleeding? · Thinning of the endometrium (lining of the uterus) called endometrial atrophy    · Polyps (noncancerous growths) that develop on the inner wall of your uterus or cervix    · Hormone replacement therapy    · Abnormal thickening of the endometrium called endometrial hyperplasia    · Tamoxifen (medicine used to treat breast cancer)    · Cervical, endometrial, or uterine cancer    How is postmenopausal bleeding diagnosed? Your healthcare provider will ask about medical conditions that you have, and that run in your family  He or she will also do a pelvic exam to check for problems with your cervix, uterus, and ovaries  You may also need any of the following:  · An ultrasound  uses sound waves to show pictures of your uterus on a monitor  Your healthcare provider may place saline fluid into your uterus with a catheter  The fluid helps show more detail in the ultrasound pictures of your uterus  · Endometrial biopsy  is used to collect a sample of cells from the inside of your uterus to be tested for cancer  · Hysteroscopy  is a procedure that is done to look inside your uterus  A small scope with a light and camera is placed into your vagina and cervix  Liquid or gas may be put through the scope to help healthcare providers see better  · Dilation and curettage (D&C)  is a procedure to remove tissue from the lining of your uterus  The tissue is sent to a lab for tests  How is postmenopausal bleeding treated? Treatment depends on the cause of your postmenopausal bleeding  If you have polyps, you may need surgery to remove them  Endometrial atrophy can be treated with medicines  Endometrial hyperplasia may be treated with progestin hormone therapy  Surgery to remove your uterus will be needed if you have endometrial or uterine cancer  Your fallopian tubes, ovaries, and nearby lymph nodes may also be removed  When should I call my doctor? · You continue to have vaginal bleeding, even with treatment  · You have pain in your abdomen or pelvis  · You have questions or concerns about your condition or care  CARE AGREEMENT:   You have the right to help plan your care  Learn about your health condition and how it may be treated  Discuss treatment options with your healthcare providers to decide what care you want to receive  You always have the right to refuse treatment  The above information is an  only  It is not intended as medical advice for individual conditions or treatments  Talk to your doctor, nurse or pharmacist before following any medical regimen to see if it is safe and effective for you  © Copyright [employer] 2021 Information is for End User's use only and may not be sold, redistributed or otherwise used for commercial purposes   All illustrations and images included in CareNotes® are the copyrighted property of A D A M , Inc  or [address] [place] [address] [employer]

## 2021-09-10 NOTE — OP NOTE
OPERATIVE REPORT  PATIENT NAME: Zo Saunders    :  1970  MRN: [id number]  Pt Location:  OR ROOM 03    SURGERY DATE: 9/10/2021    Surgeon(s) and Role:     * Zulma Barroso MD - Primary     * Silver Obrien MD - Assisting    Preop Diagnosis:  Recurrent Post-menopausal bleeding [N95 0]  Abnormal ultrasound   Cervical stenosis    Post-Op Diagnosis Codes:    Same as above    Procedure(s) (LRB):  DILATATION AND CURETTAGE (D&C) WITH HYSTEROSCOPY (N/A)    Specimen(s):  ID Type Source Tests Collected by Time Destination   1 : [facility] Tissue Cervix, Endocervical TISSUE EXAM Zulma Barroso MD 9/10/2021 1513    2 : [facility] Tissue Endometrium TISSUE EXAM Zulma Barroso MD 9/10/2021 1520        Estimated Blood Loss:   5cc    Drains:  Fluid deficit 300cc    Anesthesia Type:   General LMA    Operative Indications:  Post-menopausal bleeding [N95 0]  Abnormal ultrasound   Cervical stenosis    Operative Findings:  Enlarged anteverted uterus  Multiparous appearing cervix  Stenosis of the internal os  Arcuate uterus  Uterine synechiae  Atrophic appearing endometrium    Complications:   None apparent    Procedure and Technique:  Patient was taken to the operating room  General LMA anesthesia (LMA) was administered and the patient was positioned on the OR table in the dorsal lithotomy position  All pressure points were padded and a cheli hugger was placed to maintain control of core body temperature  A bimanual exam was performed and the uterus was noted to be anteverted, enlarged in size and consistency with no palpable adnexal masses or fullness  The patient was prepped and draped in the usual sterile fashion  A time out was performed to confirm correct patient and correct procedure  A weighted speculum was inserted into the vagina and a [place] retractor was used to visualize the anterior lip of the cervix, which was then grasped with a double toothed tenaculum  Lacrimal dilators were used to dilate the internal os    The cervix was serially dilated to 19Fr using Joel dilators for introduction of the hysteroscope  Hysteroscope was introduced under direct visualization using normal saline solution as the distention media  Hydrodissection opened the internal cervical os and the hysteroscope was withdrawn  The synechiae noted above were noted at this time  Endocervical curettings were collected at this time and sent for pathology  Hysteroscope was withdrawn and the uterus was serially dilated to accommodate a medium curette  Sharp curetting was performed, starting at the 12'oclock position and rotating a total of 360 degrees to cover all surfaces  Endometrial tissue was obtained and sent for pathology  The hysteroscope was then re-introduced under direct visualization, again using normal saline solution as the distention media  Entire uterine cavity was inspected in a systematic manner  Adequate curetting was confirmed and hysteroscopy was withdrawn  At this time an arcuate uterus was noted  The tenaculum was removed from the anterior lip of the cervix  Good hemostasis was confirmed at the tenaculum puncture sites  All instruments were then removed from the vagina  At the conclusion of the procedure, all needle, sponge, and instrument counts were noted to be correct x2  Dr Silvino Moreira was present and participated in all key portions of the case            Patient Disposition:  PACU     SIGNATURE: Leeroy Pleitez MD  DATE: September 10, 2021  TIME: 3:35 PM

## 2021-09-10 NOTE — ANESTHESIA POSTPROCEDURE EVALUATION
Post-Op Assessment Note    CV Status:  Stable  Pain Score: 0    Pain management: adequate     Mental Status:  Arousable   Hydration Status:  Euvolemic   PONV Controlled:  Controlled   Airway Patency:  Patent      Post Op Vitals Reviewed: Yes      Staff: Anesthesiologist, CRNA         No complications documented      BP   125/65   Temp   97 7   Pulse  94   Resp   10   SpO2   100 face mask

## 2021-09-10 NOTE — ANESTHESIA PREPROCEDURE EVALUATION
Procedure:  DILATATION AND CURETTAGE (D&C) WITH HYSTEROSCOPY (N/A Uterus)    Relevant Problems   NEURO/PSYCH   (+) History of herpes genitalis        Physical Exam    Airway    Mallampati score: I  TM Distance: >3 FB  Neck ROM: full     Dental       Cardiovascular  Cardiovascular exam normal    Pulmonary  Pulmonary exam normal     Other Findings        Anesthesia Plan  ASA Score- 1     Anesthesia Type- general with ASA Monitors  Additional Monitors:   Airway Plan: ETT and LMA  Plan Factors-Exercise tolerance (METS): >4 METS  Chart reviewed  EKG reviewed  Imaging results reviewed  Existing labs reviewed  Patient summary reviewed  Induction- intravenous  Postoperative Plan- Plan for postoperative opioid use  Planned trial extubation    Informed Consent- Anesthetic plan and risks discussed with patient  I personally reviewed this patient with the CRNA  Discussed and agreed on the Anesthesia Plan with the CRNA  Ramses Coyne

## 2021-09-16 RX ORDER — ESTRADIOL 0.1 MG/G
1 CREAM VAGINAL 2 TIMES WEEKLY
Qty: 42.5 G | Refills: 3 | Status: CANCELLED | OUTPATIENT
Start: 2021-09-16

## 2021-09-16 NOTE — PROGRESS NOTES
Assessment/Plan:   normal postoperative visit   pre cancerous lesion of the uterus contraindicating the use of vaginal estrogen  Hysterectomy recommended   appointment made with Dr Landry        Operative Findings:  Enlarged anteverted uterus  [ 3 5 cm fibroid on US]  Multiparous appearing cervix  Stenosis of the internal os  Arcuate uterus  Uterine synechiae  Atrophic appearing endometrium    Pathology:  Final Diagnosis   A  Endocervix (biopsy):  - Chronic cervicitis and squamous metaplasia  - Negative for dysplasia      B  Endometrium (biopsy):  - Complex endometrial hyperplasia with extensive breakdown, ciliated cell metaplasia and focal papillary proliferation of endometrium  - Benign squamous mucosa      Comment: No definite atypia noted, however extensive breakdown and metaplastic changes preclude evaluation of atypia         Diagnoses and all orders for this visit:    Postoperative visit    PMB (postmenopausal bleeding)    Cervical stenosis (uterine cervix)    Intramural uterine fibroid    Atrophic vaginitis    Dyspareunia in female    Other orders  -     Cancel: estradiol (ESTRACE) 0 1 mg/g vaginal cream; Insert 1 g into the vagina 2 (two) times a week              Subjective:        Patient ID: Kel Meyer is a 46 y o  female  Susan's postoperative course was uneventful  She has no complaints  Her pathology report was a surprise to me and I reached out yesterday in an e-mail to Dr Juan Alberto Grossman  He is recommending a hysterectomy before beginning estrogen therapy  I will be referring her to Dr Kassandra Erazo  Total laparoscopic hysterectomy with bilateral salpingectomy was explained  At her age she would benefit from leaving the ovaries unless they are diseased  The following portions of the patient's history were reviewed and updated as appropriate: She  has a past medical history of Migraine, PONV (postoperative nausea and vomiting), and Stress fracture of foot    Patient Active Problem List    Diagnosis Date Noted    Status post D&C 09/10/2021    Cervical stenosis (uterine cervix) 09/02/2021    Atrophic vaginitis 09/02/2021    Abnormal ultrasound of uterus 03/22/2021    Intramural leiomyoma of uterus 03/22/2021    PMB (postmenopausal bleeding) 03/14/2021    Dyspareunia in female 03/14/2021    History of herpes genitalis 10/31/2018    Encounter for annual routine gynecological examination 10/30/2018    Encounter for screening mammogram for malignant neoplasm of breast 10/30/2018     She  has a past surgical history that includes Breast lumpectomy; US guided breast biopsy right complete (Right, 6/11/2018); US guidance breast biopsy right each additional (Right, 6/11/2018); Breast biopsy (Right, 06/11/2018); and pr hysteroscopy,w/endo bx (N/A, 9/10/2021)  Her family history includes BRCA1 Negative in her sister; BRCA2 Negative in her sister; Breast cancer (age of onset: 37) in her sister; Heart attack in her father and paternal grandfather; No Known Problems in her daughter, maternal aunt, mother, paternal aunt, sister, and son  She  reports that she has never smoked  She has never used smokeless tobacco  She reports current alcohol use  She reports that she does not use drugs  Current Outpatient Medications   Medication Sig Dispense Refill    aspirin-acetaminophen-caffeine (EXCEDRIN MIGRAINE) 250-250-65 MG per tablet Take by mouth      Calcium Carb-Cholecalciferol (CALCIUM 600-D PO) Take by mouth      eletriptan (RELPAX) 40 MG tablet Take one tablet at onset of migraine    One tablet 2 hours later PRN      Multiple Vitamin (MULTIVITAMIN) tablet Take 1 tablet by mouth daily      prednisoLONE acetate (PRED FORTE) 1 % ophthalmic suspension PUT 1 DROP INTO LEFT EYE 3 TIMES A DAY  0     No current facility-administered medications for this visit       Current Outpatient Medications on File Prior to Visit   Medication Sig    aspirin-acetaminophen-caffeine (EXCEDRIN MIGRAINE) 250-250-65 MG per tablet Take by mouth    Calcium Carb-Cholecalciferol (CALCIUM 600-D PO) Take by mouth    eletriptan (RELPAX) 40 MG tablet Take one tablet at onset of migraine    One tablet 2 hours later PRN    Multiple Vitamin (MULTIVITAMIN) tablet Take 1 tablet by mouth daily    prednisoLONE acetate (PRED FORTE) 1 % ophthalmic suspension PUT 1 DROP INTO LEFT EYE 3 TIMES A DAY     No current facility-administered medications on file prior to visit  She is allergic to other       Review of Systems   Constitutional: Negative  Objective:    Vitals:    09/17/21 0832   BP: 110/60   Weight: 77 7 kg (171 lb 3 2 oz)   Height: 5' 10" (1 778 m)            Physical Exam  Constitutional:       Appearance: Normal appearance  Neurological:      Mental Status: She is alert and oriented to person, place, and time  Psychiatric:         Mood and Affect: Mood normal          Behavior: Behavior normal          Thought Content:  Thought content normal          Judgment: Judgment normal

## 2021-09-17 ENCOUNTER — OFFICE VISIT (OUTPATIENT)
Dept: OBGYN CLINIC | Facility: CLINIC | Age: 51
End: 2021-09-17

## 2021-09-17 VITALS
WEIGHT: 171.2 LBS | BODY MASS INDEX: 24.51 KG/M2 | HEIGHT: 70 IN | SYSTOLIC BLOOD PRESSURE: 110 MMHG | DIASTOLIC BLOOD PRESSURE: 60 MMHG

## 2021-09-17 DIAGNOSIS — N94.10 DYSPAREUNIA IN FEMALE: ICD-10-CM

## 2021-09-17 DIAGNOSIS — N88.2 CERVICAL STENOSIS (UTERINE CERVIX): ICD-10-CM

## 2021-09-17 DIAGNOSIS — N95.0 PMB (POSTMENOPAUSAL BLEEDING): ICD-10-CM

## 2021-09-17 DIAGNOSIS — D25.1 INTRAMURAL UTERINE FIBROID: ICD-10-CM

## 2021-09-17 DIAGNOSIS — N95.2 ATROPHIC VAGINITIS: ICD-10-CM

## 2021-09-17 DIAGNOSIS — Z48.89 POSTOPERATIVE VISIT: Primary | ICD-10-CM

## 2021-09-17 PROCEDURE — 99024 POSTOP FOLLOW-UP VISIT: CPT | Performed by: OBSTETRICS & GYNECOLOGY

## 2021-09-30 ENCOUNTER — OFFICE VISIT (OUTPATIENT)
Dept: OBGYN CLINIC | Facility: CLINIC | Age: 51
End: 2021-09-30
Payer: COMMERCIAL

## 2021-09-30 VITALS
BODY MASS INDEX: 24.88 KG/M2 | SYSTOLIC BLOOD PRESSURE: 99 MMHG | WEIGHT: 173.8 LBS | HEIGHT: 70 IN | DIASTOLIC BLOOD PRESSURE: 64 MMHG

## 2021-09-30 DIAGNOSIS — N85.01 COMPLEX ENDOMETRIAL HYPERPLASIA: Primary | ICD-10-CM

## 2021-09-30 PROBLEM — N95.0 PMB (POSTMENOPAUSAL BLEEDING): Status: RESOLVED | Noted: 2021-03-14 | Resolved: 2021-09-30

## 2021-09-30 PROBLEM — Z98.890 STATUS POST D&C: Status: RESOLVED | Noted: 2021-09-10 | Resolved: 2021-09-30

## 2021-09-30 PROCEDURE — 99215 OFFICE O/P EST HI 40 MIN: CPT | Performed by: OBSTETRICS & GYNECOLOGY

## 2021-09-30 NOTE — PROGRESS NOTES
HPI:    Radha Macias is a 50yo who presents as a pre-operative hysterectomy consult as referred by Dr Darya Alegre  She was seen for postmenopausal bleeding in setting of desire for vaginal estrogen due to dyspareunia - and subsequently underwent a D&C, Hysteroscopy for evaluation  Her pathology was consistent with complex endometrial hyperplasia  Her case was discussed with Dr Martinez Headings by Dr Darya Alegre who recommended proceeding with a TLH, BSO for treatment  Her concerns today include is a hysterectomy needed, and can she keep her ovaries  She is postmenopausal and is no longer having issues with night sweats or hot flashes  Main complaint is dyspareunia with insertion  Her obstetric history is C/S x 1 and  x 1        OB History        2    Para   2    Term   0       2    AB        Living   2       SAB        TAB        Ectopic        Multiple        Live Births   2                 Past Medical History:   Diagnosis Date    Migraine     PONV (postoperative nausea and vomiting)     Stress fracture of foot        Past Surgical History:   Procedure Laterality Date    BREAST BIOPSY Right 2018    negative    BREAST LUMPECTOMY      NE HYSTEROSCOPY,W/ENDO BX N/A 9/10/2021    Procedure: DILATATION AND CURETTAGE (D&C) WITH HYSTEROSCOPY;  Surgeon: Pino Schaefer MD;  [facility];  Service: Gynecology    US GUIDANCE BREAST BIOPSY RIGHT EACH ADDITIONAL Right 2018    US GUIDED BREAST BIOPSY RIGHT COMPLETE Right 2018         Current Outpatient Medications:     aspirin-acetaminophen-caffeine (EXCEDRIN MIGRAINE) 250-250-65 MG per tablet, Take by mouth, Disp: , Rfl:     Calcium Carb-Cholecalciferol (CALCIUM 600-D PO), Take by mouth, Disp: , Rfl:     eletriptan (RELPAX) 40 MG tablet, Take one tablet at onset of migraine    One tablet 2 hours later PRN, Disp: , Rfl:     Multiple Vitamin (MULTIVITAMIN) tablet, Take 1 tablet by mouth daily, Disp: , Rfl:     prednisoLONE acetate (PRED FORTE) 1 % ophthalmic suspension, PUT 1 DROP INTO LEFT EYE 3 TIMES A DAY (Patient not taking: Reported on 2021), Disp: , Rfl: 0        Objective:    BP 99/64 (BP Location: Left arm)   Ht 5' 10" (1 778 m)   Wt 78 8 kg (173 lb 12 8 oz)   LMP 10/20/2020   Breastfeeding Unknown   BMI 24 94 kg/m²     Physical Exam  Constitutional:       Appearance: Normal appearance  Genitourinary:      Vulva, cervix, uterus, right adnexa and left adnexa normal    Cardiovascular:      Rate and Rhythm: Normal rate and regular rhythm  Pulmonary:      Effort: Pulmonary effort is normal  No respiratory distress  Abdominal:      General: There is no distension  Palpations: Abdomen is soft  Tenderness: There is no abdominal tenderness  Neurological:      Mental Status: She is alert  Vitals and nursing note reviewed  Assessment:     46 y o   for Total Laparoscopic Hysterectomy, Bilateral Salpingectomy, Cystoscopy for Complex Endometrial Hyperplasia    Plan:    Brandon discussed removal of her ovaries at length  We discussed her postmenopausal status, and recommended from gyn onc for BSO  Dr Salo Foreman had suggested she keep her ovaries due to possible cardiac protection  We discussed risk of undiagnosed early endometrial cancer in setting of hyperplasia, and the standard of care for that diagnosis is removal   We have low suspicion of endometrial cancer at this time  She is aware she would potentially need an additional surgery if this is the final diagnosis  At this time, consent plan for retention of ovaries, but will discuss with Dr Salo Foreman and patient  Consent obtained today to proceed with Total Laparoscopic Hysterectomy, Bilateral Salpingectomy, Cystoscopy  We discussed that this means removal of the uterus, cervix and fallopian tubes        We discussed risks of surgery including but not limited to:  bleeding, infection, blood clots, wound healing problems, need for laparotomy, damage to surrounding organs not limited to - bladder, bowels, blood vessels, nerves, ureters, ovaries  All patient questions were answered to her apparent satisfaction and consent form was signed  I spent 40 minutes with the patient and in chart review/documentation, >50% counseling

## 2021-10-01 ENCOUNTER — TELEPHONE (OUTPATIENT)
Dept: OBGYN CLINIC | Facility: CLINIC | Age: 51
End: 2021-10-01

## 2021-10-05 ENCOUNTER — TELEPHONE (OUTPATIENT)
Dept: OBGYN CLINIC | Facility: CLINIC | Age: 51
End: 2021-10-05

## 2021-11-15 ENCOUNTER — TELEPHONE (OUTPATIENT)
Dept: OBGYN CLINIC | Facility: CLINIC | Age: 51
End: 2021-11-15

## 2021-12-14 ENCOUNTER — LAB REQUISITION (OUTPATIENT)
Dept: LAB | Facility: HOSPITAL | Age: 51
End: 2021-12-14
Payer: COMMERCIAL

## 2021-12-14 DIAGNOSIS — N85.01 BENIGN ENDOMETRIAL HYPERPLASIA: ICD-10-CM

## 2021-12-14 LAB
ABO GROUP BLD: NORMAL
BLD GP AB SCN SERPL QL: NEGATIVE
RH BLD: POSITIVE
SPECIMEN EXPIRATION DATE: NORMAL

## 2021-12-14 PROCEDURE — 86850 RBC ANTIBODY SCREEN: CPT | Performed by: OBSTETRICS & GYNECOLOGY

## 2021-12-14 PROCEDURE — 86901 BLOOD TYPING SEROLOGIC RH(D): CPT | Performed by: OBSTETRICS & GYNECOLOGY

## 2021-12-14 PROCEDURE — NC001 PR NO CHARGE: Performed by: OBSTETRICS & GYNECOLOGY

## 2021-12-14 PROCEDURE — 86900 BLOOD TYPING SEROLOGIC ABO: CPT | Performed by: OBSTETRICS & GYNECOLOGY

## 2021-12-17 ENCOUNTER — ANESTHESIA EVENT (OUTPATIENT)
Dept: PERIOP | Facility: HOSPITAL | Age: 51
End: 2021-12-17
Payer: COMMERCIAL

## 2021-12-17 ENCOUNTER — ANESTHESIA (OUTPATIENT)
Dept: PERIOP | Facility: HOSPITAL | Age: 51
End: 2021-12-17
Payer: COMMERCIAL

## 2021-12-17 ENCOUNTER — HOSPITAL ENCOUNTER (OUTPATIENT)
Facility: HOSPITAL | Age: 51
Setting detail: OUTPATIENT SURGERY
Discharge: HOME | End: 2021-12-17
Attending: OBSTETRICS & GYNECOLOGY | Admitting: OBSTETRICS & GYNECOLOGY
Payer: COMMERCIAL

## 2021-12-17 VITALS
HEART RATE: 76 BPM | OXYGEN SATURATION: 100 % | SYSTOLIC BLOOD PRESSURE: 147 MMHG | WEIGHT: 172 LBS | HEIGHT: 70 IN | BODY MASS INDEX: 24.62 KG/M2 | RESPIRATION RATE: 18 BRPM | DIASTOLIC BLOOD PRESSURE: 85 MMHG | TEMPERATURE: 98.1 F

## 2021-12-17 DIAGNOSIS — Z90.79 S/P TOTAL HYSTERECTOMY AND BSO (BILATERAL SALPINGO-OOPHORECTOMY): Primary | ICD-10-CM

## 2021-12-17 DIAGNOSIS — N85.01 COMPLEX ENDOMETRIAL HYPERPLASIA: ICD-10-CM

## 2021-12-17 DIAGNOSIS — Z90.710 S/P TOTAL HYSTERECTOMY AND BSO (BILATERAL SALPINGO-OOPHORECTOMY): Primary | ICD-10-CM

## 2021-12-17 DIAGNOSIS — Z90.722 S/P TOTAL HYSTERECTOMY AND BSO (BILATERAL SALPINGO-OOPHORECTOMY): Primary | ICD-10-CM

## 2021-12-17 PROBLEM — N88.2 CERVICAL STENOSIS (UTERINE CERVIX): Status: RESOLVED | Noted: 2021-09-02 | Resolved: 2021-12-17

## 2021-12-17 LAB
EXT PREGNANCY TEST URINE: NEGATIVE
EXT. CONTROL: NORMAL

## 2021-12-17 PROCEDURE — 81025 URINE PREGNANCY TEST: CPT | Performed by: OBSTETRICS & GYNECOLOGY

## 2021-12-17 PROCEDURE — 88309 TISSUE EXAM BY PATHOLOGIST: CPT | Performed by: SPECIALIST

## 2021-12-17 PROCEDURE — 58571 TLH W/T/O 250 G OR LESS: CPT | Performed by: OBSTETRICS & GYNECOLOGY

## 2021-12-17 RX ORDER — SODIUM CHLORIDE, SODIUM LACTATE, POTASSIUM CHLORIDE, CALCIUM CHLORIDE 600; 310; 30; 20 MG/100ML; MG/100ML; MG/100ML; MG/100ML
125 INJECTION, SOLUTION INTRAVENOUS CONTINUOUS
Status: DISCONTINUED | OUTPATIENT
Start: 2021-12-17 | End: 2021-12-17 | Stop reason: HOSPADM

## 2021-12-17 RX ORDER — KETAMINE HYDROCHLORIDE 50 MG/ML
INJECTION, SOLUTION, CONCENTRATE INTRAMUSCULAR; INTRAVENOUS AS NEEDED
Status: DISCONTINUED | OUTPATIENT
Start: 2021-12-17 | End: 2021-12-17

## 2021-12-17 RX ORDER — MIDAZOLAM HYDROCHLORIDE 2 MG/2ML
INJECTION, SOLUTION INTRAMUSCULAR; INTRAVENOUS AS NEEDED
Status: DISCONTINUED | OUTPATIENT
Start: 2021-12-17 | End: 2021-12-17

## 2021-12-17 RX ORDER — ACETAMINOPHEN 325 MG/1
975 TABLET ORAL ONCE
Status: COMPLETED | OUTPATIENT
Start: 2021-12-17 | End: 2021-12-17

## 2021-12-17 RX ORDER — BUPIVACAINE HYDROCHLORIDE 2.5 MG/ML
INJECTION, SOLUTION EPIDURAL; INFILTRATION; INTRACAUDAL AS NEEDED
Status: DISCONTINUED | OUTPATIENT
Start: 2021-12-17 | End: 2021-12-17 | Stop reason: HOSPADM

## 2021-12-17 RX ORDER — ONDANSETRON 2 MG/ML
4 INJECTION INTRAMUSCULAR; INTRAVENOUS EVERY 6 HOURS PRN
Status: DISCONTINUED | OUTPATIENT
Start: 2021-12-17 | End: 2021-12-17 | Stop reason: HOSPADM

## 2021-12-17 RX ORDER — NEOSTIGMINE METHYLSULFATE 1 MG/ML
INJECTION INTRAVENOUS AS NEEDED
Status: DISCONTINUED | OUTPATIENT
Start: 2021-12-17 | End: 2021-12-17

## 2021-12-17 RX ORDER — DOCUSATE SODIUM 100 MG/1
100 CAPSULE, LIQUID FILLED ORAL 2 TIMES DAILY
Status: DISCONTINUED | OUTPATIENT
Start: 2021-12-17 | End: 2021-12-17 | Stop reason: HOSPADM

## 2021-12-17 RX ORDER — PROMETHAZINE HYDROCHLORIDE 25 MG/ML
12.5 INJECTION, SOLUTION INTRAMUSCULAR; INTRAVENOUS ONCE
Status: COMPLETED | OUTPATIENT
Start: 2021-12-17 | End: 2021-12-17

## 2021-12-17 RX ORDER — HYDROMORPHONE HCL/PF 1 MG/ML
0.4 SYRINGE (ML) INJECTION
Status: DISCONTINUED | OUTPATIENT
Start: 2021-12-17 | End: 2021-12-17 | Stop reason: HOSPADM

## 2021-12-17 RX ORDER — GLYCOPYRROLATE 0.2 MG/ML
INJECTION INTRAMUSCULAR; INTRAVENOUS AS NEEDED
Status: DISCONTINUED | OUTPATIENT
Start: 2021-12-17 | End: 2021-12-17

## 2021-12-17 RX ORDER — ONDANSETRON 2 MG/ML
4 INJECTION INTRAMUSCULAR; INTRAVENOUS ONCE AS NEEDED
Status: COMPLETED | OUTPATIENT
Start: 2021-12-17 | End: 2021-12-17

## 2021-12-17 RX ORDER — CELECOXIB 200 MG/1
200 CAPSULE ORAL ONCE
Status: COMPLETED | OUTPATIENT
Start: 2021-12-17 | End: 2021-12-17

## 2021-12-17 RX ORDER — MAGNESIUM HYDROXIDE 1200 MG/15ML
LIQUID ORAL AS NEEDED
Status: DISCONTINUED | OUTPATIENT
Start: 2021-12-17 | End: 2021-12-17 | Stop reason: HOSPADM

## 2021-12-17 RX ORDER — FENTANYL CITRATE/PF 50 MCG/ML
25 SYRINGE (ML) INJECTION
Status: DISCONTINUED | OUTPATIENT
Start: 2021-12-17 | End: 2021-12-17

## 2021-12-17 RX ORDER — OXYCODONE HYDROCHLORIDE 5 MG/1
10 TABLET ORAL EVERY 4 HOURS PRN
Status: DISCONTINUED | OUTPATIENT
Start: 2021-12-17 | End: 2021-12-17 | Stop reason: HOSPADM

## 2021-12-17 RX ORDER — GABAPENTIN 100 MG/1
100 CAPSULE ORAL ONCE
Status: COMPLETED | OUTPATIENT
Start: 2021-12-17 | End: 2021-12-17

## 2021-12-17 RX ORDER — PROPOFOL 10 MG/ML
INJECTION, EMULSION INTRAVENOUS AS NEEDED
Status: DISCONTINUED | OUTPATIENT
Start: 2021-12-17 | End: 2021-12-17

## 2021-12-17 RX ORDER — SODIUM CHLORIDE, SODIUM LACTATE, POTASSIUM CHLORIDE, CALCIUM CHLORIDE 600; 310; 30; 20 MG/100ML; MG/100ML; MG/100ML; MG/100ML
INJECTION, SOLUTION INTRAVENOUS CONTINUOUS PRN
Status: DISCONTINUED | OUTPATIENT
Start: 2021-12-17 | End: 2021-12-17

## 2021-12-17 RX ORDER — IBUPROFEN 600 MG/1
600 TABLET ORAL EVERY 6 HOURS PRN
Qty: 60 TABLET | Refills: 0 | Status: SHIPPED | OUTPATIENT
Start: 2021-12-17 | End: 2022-01-03 | Stop reason: ALTCHOICE

## 2021-12-17 RX ORDER — PROPOFOL 10 MG/ML
INJECTION, EMULSION INTRAVENOUS CONTINUOUS PRN
Status: DISCONTINUED | OUTPATIENT
Start: 2021-12-17 | End: 2021-12-17

## 2021-12-17 RX ORDER — ROCURONIUM BROMIDE 10 MG/ML
INJECTION, SOLUTION INTRAVENOUS AS NEEDED
Status: DISCONTINUED | OUTPATIENT
Start: 2021-12-17 | End: 2021-12-17

## 2021-12-17 RX ORDER — OXYCODONE HYDROCHLORIDE 5 MG/1
5 TABLET ORAL EVERY 4 HOURS PRN
Status: DISCONTINUED | OUTPATIENT
Start: 2021-12-17 | End: 2021-12-17 | Stop reason: HOSPADM

## 2021-12-17 RX ORDER — MEPERIDINE HYDROCHLORIDE 50 MG/ML
12.5 INJECTION INTRAMUSCULAR; INTRAVENOUS; SUBCUTANEOUS ONCE
Status: COMPLETED | OUTPATIENT
Start: 2021-12-17 | End: 2021-12-17

## 2021-12-17 RX ORDER — SCOLOPAMINE TRANSDERMAL SYSTEM 1 MG/1
1 PATCH, EXTENDED RELEASE TRANSDERMAL ONCE
Status: DISCONTINUED | OUTPATIENT
Start: 2021-12-17 | End: 2021-12-17

## 2021-12-17 RX ORDER — SODIUM CHLORIDE 9 MG/ML
INJECTION, SOLUTION INTRAVENOUS CONTINUOUS PRN
Status: DISCONTINUED | OUTPATIENT
Start: 2021-12-17 | End: 2021-12-17

## 2021-12-17 RX ORDER — SODIUM CHLORIDE 9 MG/ML
125 INJECTION, SOLUTION INTRAVENOUS CONTINUOUS
Status: DISCONTINUED | OUTPATIENT
Start: 2021-12-17 | End: 2021-12-17 | Stop reason: HOSPADM

## 2021-12-17 RX ORDER — KETOROLAC TROMETHAMINE 30 MG/ML
INJECTION, SOLUTION INTRAMUSCULAR; INTRAVENOUS AS NEEDED
Status: DISCONTINUED | OUTPATIENT
Start: 2021-12-17 | End: 2021-12-17

## 2021-12-17 RX ORDER — LIDOCAINE HYDROCHLORIDE 10 MG/ML
INJECTION, SOLUTION EPIDURAL; INFILTRATION; INTRACAUDAL; PERINEURAL AS NEEDED
Status: DISCONTINUED | OUTPATIENT
Start: 2021-12-17 | End: 2021-12-17

## 2021-12-17 RX ORDER — IBUPROFEN 600 MG/1
600 TABLET ORAL EVERY 6 HOURS PRN
Status: DISCONTINUED | OUTPATIENT
Start: 2021-12-17 | End: 2021-12-17 | Stop reason: HOSPADM

## 2021-12-17 RX ORDER — CEFAZOLIN SODIUM 2 G/50ML
SOLUTION INTRAVENOUS AS NEEDED
Status: DISCONTINUED | OUTPATIENT
Start: 2021-12-17 | End: 2021-12-17

## 2021-12-17 RX ORDER — ONDANSETRON 2 MG/ML
INJECTION INTRAMUSCULAR; INTRAVENOUS AS NEEDED
Status: DISCONTINUED | OUTPATIENT
Start: 2021-12-17 | End: 2021-12-17

## 2021-12-17 RX ORDER — DEXAMETHASONE SODIUM PHOSPHATE 10 MG/ML
INJECTION, SOLUTION INTRAMUSCULAR; INTRAVENOUS AS NEEDED
Status: DISCONTINUED | OUTPATIENT
Start: 2021-12-17 | End: 2021-12-17

## 2021-12-17 RX ORDER — OXYCODONE HYDROCHLORIDE 5 MG/1
5 TABLET ORAL EVERY 4 HOURS PRN
Qty: 6 TABLET | Refills: 0 | Status: SHIPPED | OUTPATIENT
Start: 2021-12-17 | End: 2021-12-27

## 2021-12-17 RX ORDER — CEFAZOLIN SODIUM 2 G/50ML
2000 SOLUTION INTRAVENOUS ONCE
Status: DISCONTINUED | OUTPATIENT
Start: 2021-12-17 | End: 2021-12-17

## 2021-12-17 RX ORDER — FENTANYL CITRATE/PF 50 MCG/ML
50 SYRINGE (ML) INJECTION
Status: DISCONTINUED | OUTPATIENT
Start: 2021-12-17 | End: 2021-12-17 | Stop reason: HOSPADM

## 2021-12-17 RX ORDER — FENTANYL CITRATE 50 UG/ML
INJECTION, SOLUTION INTRAMUSCULAR; INTRAVENOUS AS NEEDED
Status: DISCONTINUED | OUTPATIENT
Start: 2021-12-17 | End: 2021-12-17

## 2021-12-17 RX ORDER — ACETAMINOPHEN 325 MG/1
650 TABLET ORAL EVERY 6 HOURS PRN
Status: DISCONTINUED | OUTPATIENT
Start: 2021-12-17 | End: 2021-12-17 | Stop reason: HOSPADM

## 2021-12-17 RX ORDER — ONDANSETRON 2 MG/ML
4 INJECTION INTRAMUSCULAR; INTRAVENOUS ONCE AS NEEDED
Status: DISCONTINUED | OUTPATIENT
Start: 2021-12-17 | End: 2021-12-17

## 2021-12-17 RX ORDER — HYDROMORPHONE HCL/PF 1 MG/ML
0.5 SYRINGE (ML) INJECTION EVERY 6 HOURS PRN
Status: DISCONTINUED | OUTPATIENT
Start: 2021-12-17 | End: 2021-12-17 | Stop reason: HOSPADM

## 2021-12-17 RX ORDER — HYDROMORPHONE HCL/PF 1 MG/ML
0.5 SYRINGE (ML) INJECTION
Status: DISCONTINUED | OUTPATIENT
Start: 2021-12-17 | End: 2021-12-17

## 2021-12-17 RX ORDER — PHENAZOPYRIDINE HYDROCHLORIDE 100 MG/1
100 TABLET, FILM COATED ORAL
Status: COMPLETED | OUTPATIENT
Start: 2021-12-17 | End: 2021-12-17

## 2021-12-17 RX ADMIN — NEOSTIGMINE METHYLSULFATE 4 MG: 1 INJECTION INTRAVENOUS at 13:09

## 2021-12-17 RX ADMIN — ROCURONIUM BROMIDE 50 MG: 50 INJECTION, SOLUTION INTRAVENOUS at 11:39

## 2021-12-17 RX ADMIN — GLYCOPYRROLATE 0.6 MG: 0.2 INJECTION, SOLUTION INTRAMUSCULAR; INTRAVENOUS at 13:09

## 2021-12-17 RX ADMIN — IBUPROFEN 600 MG: 600 TABLET ORAL at 16:12

## 2021-12-17 RX ADMIN — PROPOFOL 200 MG: 10 INJECTION, EMULSION INTRAVENOUS at 11:39

## 2021-12-17 RX ADMIN — KETAMINE HYDROCHLORIDE 30 MG: 50 INJECTION, SOLUTION INTRAMUSCULAR; INTRAVENOUS at 11:39

## 2021-12-17 RX ADMIN — KETAMINE HYDROCHLORIDE 20 MG: 50 INJECTION, SOLUTION INTRAMUSCULAR; INTRAVENOUS at 12:33

## 2021-12-17 RX ADMIN — Medication 50 MCG: at 14:20

## 2021-12-17 RX ADMIN — SODIUM CHLORIDE: 0.9 INJECTION, SOLUTION INTRAVENOUS at 11:45

## 2021-12-17 RX ADMIN — CEFAZOLIN SODIUM 2000 MG: 2 SOLUTION INTRAVENOUS at 11:47

## 2021-12-17 RX ADMIN — SODIUM CHLORIDE, SODIUM LACTATE, POTASSIUM CHLORIDE, AND CALCIUM CHLORIDE 125 ML/HR: .6; .31; .03; .02 INJECTION, SOLUTION INTRAVENOUS at 10:18

## 2021-12-17 RX ADMIN — PROPOFOL 80 MCG/KG/MIN: 10 INJECTION, EMULSION INTRAVENOUS at 11:49

## 2021-12-17 RX ADMIN — KETOROLAC TROMETHAMINE 30 MG: 30 INJECTION, SOLUTION INTRAMUSCULAR at 13:10

## 2021-12-17 RX ADMIN — MIDAZOLAM 2 MG: 1 INJECTION INTRAMUSCULAR; INTRAVENOUS at 11:32

## 2021-12-17 RX ADMIN — CELECOXIB 200 MG: 200 CAPSULE ORAL at 09:50

## 2021-12-17 RX ADMIN — MEPERIDINE HYDROCHLORIDE 12.5 MG: 50 INJECTION, SOLUTION INTRAMUSCULAR; INTRAVENOUS; SUBCUTANEOUS at 16:44

## 2021-12-17 RX ADMIN — FENTANYL CITRATE 50 MCG: 50 INJECTION INTRAMUSCULAR; INTRAVENOUS at 11:39

## 2021-12-17 RX ADMIN — GABAPENTIN 100 MG: 100 CAPSULE ORAL at 09:51

## 2021-12-17 RX ADMIN — ACETAMINOPHEN 975 MG: 325 TABLET, FILM COATED ORAL at 09:50

## 2021-12-17 RX ADMIN — PHENAZOPYRIDINE 100 MG: 100 TABLET ORAL at 09:50

## 2021-12-17 RX ADMIN — ONDANSETRON 4 MG: 2 INJECTION INTRAMUSCULAR; INTRAVENOUS at 11:39

## 2021-12-17 RX ADMIN — SCOLOPAMINE TRANSDERMAL SYSTEM 1 PATCH: 1 PATCH, EXTENDED RELEASE TRANSDERMAL at 10:19

## 2021-12-17 RX ADMIN — SODIUM CHLORIDE, SODIUM LACTATE, POTASSIUM CHLORIDE, AND CALCIUM CHLORIDE: .6; .31; .03; .02 INJECTION, SOLUTION INTRAVENOUS at 11:32

## 2021-12-17 RX ADMIN — DEXAMETHASONE SODIUM PHOSPHATE 10 MG: 10 INJECTION, SOLUTION INTRAMUSCULAR; INTRAVENOUS at 11:56

## 2021-12-17 RX ADMIN — LIDOCAINE HYDROCHLORIDE 50 MG: 10 INJECTION, SOLUTION EPIDURAL; INFILTRATION; INTRACAUDAL; PERINEURAL at 11:39

## 2021-12-17 RX ADMIN — ONDANSETRON 4 MG: 2 INJECTION INTRAMUSCULAR; INTRAVENOUS at 13:05

## 2021-12-17 RX ADMIN — ROCURONIUM BROMIDE 20 MG: 50 INJECTION, SOLUTION INTRAVENOUS at 12:29

## 2021-12-17 RX ADMIN — PROMETHAZINE HYDROCHLORIDE 6.25 MG: 25 INJECTION INTRAMUSCULAR; INTRAVENOUS at 16:48

## 2021-12-17 RX ADMIN — ONDANSETRON 4 MG: 2 INJECTION INTRAMUSCULAR; INTRAVENOUS at 14:20

## 2021-12-22 ENCOUNTER — TELEPHONE (OUTPATIENT)
Dept: OBGYN CLINIC | Facility: CLINIC | Age: 51
End: 2021-12-22

## 2022-01-03 ENCOUNTER — OFFICE VISIT (OUTPATIENT)
Dept: OBGYN CLINIC | Facility: CLINIC | Age: 52
End: 2022-01-03

## 2022-01-03 VITALS
WEIGHT: 173.6 LBS | HEIGHT: 70 IN | DIASTOLIC BLOOD PRESSURE: 62 MMHG | BODY MASS INDEX: 24.85 KG/M2 | SYSTOLIC BLOOD PRESSURE: 110 MMHG

## 2022-01-03 DIAGNOSIS — Z90.79 S/P TOTAL HYSTERECTOMY AND BSO (BILATERAL SALPINGO-OOPHORECTOMY): Primary | ICD-10-CM

## 2022-01-03 DIAGNOSIS — Z90.710 S/P TOTAL HYSTERECTOMY AND BSO (BILATERAL SALPINGO-OOPHORECTOMY): Primary | ICD-10-CM

## 2022-01-03 DIAGNOSIS — Z90.722 S/P TOTAL HYSTERECTOMY AND BSO (BILATERAL SALPINGO-OOPHORECTOMY): Primary | ICD-10-CM

## 2022-01-03 PROCEDURE — 99024 POSTOP FOLLOW-UP VISIT: CPT | Performed by: OBSTETRICS & GYNECOLOGY

## 2022-01-03 NOTE — PROGRESS NOTES
Post - Operative Visit    Lissett Ruiz is a 46 y o  female here for post-operative visit  She is s/p TLH, BSO, cysto on 12/17/21  Her surgery was uncomplicated  Her post-operative course has been uneventful  She denies fevers  She has no bowel or bladder concerns  She has no vaginal discharge or concerning bleeding  She has only had mild spotting  Past Medical History:   Diagnosis Date    Migraine     PONV (postoperative nausea and vomiting)     Stress fracture of foot      Past Surgical History:   Procedure Laterality Date    BREAST BIOPSY Right 06/11/2018    negative    BREAST LUMPECTOMY      DILATION AND CURETTAGE OF UTERUS      HEMORROIDECTOMY      HYSTERECTOMY Bilateral 12/17/2021    Procedure: TOTAL LAPAROSCOPIC HYSTERECTOMY, BILATERAL SALPINGOOPHORECTOMY;  Surgeon: Liborio Fontanez MD;  [facility];  Service: Gynecology    IA CYSTOURETHROSCOPY N/A 12/17/2021    Procedure: CYSTOSCOPY;  Surgeon: Liborio Fontanez MD;  [facility];  Service: Gynecology    IA HYSTEROSCOPY,W/ENDO BX N/A 9/10/2021    Procedure: DILATATION AND CURETTAGE (D&C) WITH HYSTEROSCOPY;  Surgeon: Erwin Go MD;  [facility];  Service: Gynecology    TUBAL LIGATION      US GUIDANCE BREAST BIOPSY RIGHT EACH ADDITIONAL Right 6/11/2018    US GUIDED BREAST BIOPSY RIGHT COMPLETE Right 6/11/2018       Objective:  /62   Ht 5' 10" (1 778 m)   Wt 78 7 kg (173 lb 9 6 oz)   BMI 24 91 kg/m²   Physical Exam  Constitutional:       General: She is not in acute distress  Appearance: Normal appearance  She is not ill-appearing or toxic-appearing  Genitourinary:      Right Labia: No rash or lesions  Left Labia: No lesions or rash  Vaginal cuff intact  Perineal sutures intact  Vaginal granulation tissue (small spot, tx) present  Abdominal:      General: A surgical scar is present  Tenderness: There is no abdominal tenderness        Comments: Incisions well healed  Skin glue removed   Neurological:      Mental Status: She is alert  Vitals and nursing note reviewed  A:  46 y o  s/p TLH, BSO, cysto  - doing well post-operatively    P:  Pathology reviewed, no further tx needed  Follow up 4 weeks for cuff check  Continue pelvic rest ,lifting restrictions

## 2022-02-08 NOTE — PROGRESS NOTES
Patient's Lab: [facility]    Complex Endometrial Hyperplasia   12/17/21 TLH and BSO  How are you: Good  Any questions or concerns: Yes, about exercise

## 2022-02-10 ENCOUNTER — OFFICE VISIT (OUTPATIENT)
Dept: OBGYN CLINIC | Facility: CLINIC | Age: 52
End: 2022-02-10

## 2022-02-10 VITALS
WEIGHT: 179.2 LBS | HEIGHT: 70 IN | BODY MASS INDEX: 25.65 KG/M2 | SYSTOLIC BLOOD PRESSURE: 92 MMHG | DIASTOLIC BLOOD PRESSURE: 52 MMHG

## 2022-02-10 DIAGNOSIS — Z90.722 S/P TOTAL HYSTERECTOMY AND BSO (BILATERAL SALPINGO-OOPHORECTOMY): Primary | ICD-10-CM

## 2022-02-10 DIAGNOSIS — Z90.79 S/P TOTAL HYSTERECTOMY AND BSO (BILATERAL SALPINGO-OOPHORECTOMY): Primary | ICD-10-CM

## 2022-02-10 DIAGNOSIS — N94.10 DYSPAREUNIA IN FEMALE: ICD-10-CM

## 2022-02-10 DIAGNOSIS — Z90.710 S/P TOTAL HYSTERECTOMY AND BSO (BILATERAL SALPINGO-OOPHORECTOMY): Primary | ICD-10-CM

## 2022-02-10 DIAGNOSIS — N95.2 ATROPHIC VAGINITIS: ICD-10-CM

## 2022-02-10 PROCEDURE — 99024 POSTOP FOLLOW-UP VISIT: CPT | Performed by: OBSTETRICS & GYNECOLOGY

## 2022-02-10 RX ORDER — ESTRADIOL 0.1 MG/G
1 CREAM VAGINAL 3 TIMES WEEKLY
Qty: 42.5 G | Refills: 1 | Status: SHIPPED | OUTPATIENT
Start: 2022-02-11 | End: 2022-03-28 | Stop reason: DRUGHIGH

## 2022-02-10 NOTE — PROGRESS NOTES
Post - Operative Visit    Leena Hutson is a 46 y o  female here for post-operative visit  She is s/p TLH, BSO, cysto on 12/17/21  Her surgery was uncomplicated  Her post-operative course has been uneventful  She denies fevers  She has no bowel or bladder concerns  She has no vaginal discharge or concerning bleeding  Past Medical History:   Diagnosis Date    Migraine     PONV (postoperative nausea and vomiting)     Stress fracture of foot      Past Surgical History:   Procedure Laterality Date    BREAST BIOPSY Right 06/11/2018    negative    BREAST LUMPECTOMY      DILATION AND CURETTAGE OF UTERUS      HEMORROIDECTOMY      HYSTERECTOMY Bilateral 12/17/2021    Procedure: TOTAL LAPAROSCOPIC HYSTERECTOMY, BILATERAL SALPINGOOPHORECTOMY;  Surgeon: Marielena Gonzalez MD;  [facility];  Service: Gynecology    SC CYSTOURETHROSCOPY N/A 12/17/2021    Procedure: CYSTOSCOPY;  Surgeon: Marielena Gonzalez MD;  [facility];  Service: Gynecology    SC HYSTEROSCOPY,W/ENDO BX N/A 9/10/2021    Procedure: DILATATION AND CURETTAGE (D&C) WITH HYSTEROSCOPY;  Surgeon: Tracy Hancock MD;  [facility];  Service: Gynecology    TUBAL LIGATION      US GUIDANCE BREAST BIOPSY RIGHT EACH ADDITIONAL Right 6/11/2018    US GUIDED BREAST BIOPSY RIGHT COMPLETE Right 6/11/2018       Objective:  BP 92/52 (BP Location: Left arm, Patient Position: Sitting, Cuff Size: Standard)   Ht 5' 9 75" (1 772 m)   Wt 81 3 kg (179 lb 3 2 oz)   LMP 10/20/2020   BMI 25 90 kg/m²   Physical Exam  Constitutional:       General: She is not in acute distress  Appearance: Normal appearance  She is not ill-appearing  Genitourinary:      Vulva normal       Vaginal cuff intact  Vaginal granulation tissue present  No vaginal discharge, tenderness or cuff induration  Erythema: small area, treated  No vaginal prolapse present  Mild vaginal atrophy present  Right Adnexa: absent       Left Adnexa: absent  Cervix is absent  Uterus is absent  Abdominal:      Palpations: Abdomen is soft  Neurological:      Mental Status: She is alert  Vitals and nursing note reviewed  A:  46 y o  s/p TLH, BSO, cysto  - doing well post-operatively    P:  Okay to start estrace vaginally - rx sent  Will follow up with Dr Sarai Vance for annual exam    Activity as tolerated

## 2022-03-07 ENCOUNTER — TELEPHONE (OUTPATIENT)
Dept: OBGYN CLINIC | Facility: CLINIC | Age: 52
End: 2022-03-07

## 2022-03-07 DIAGNOSIS — Z12.31 VISIT FOR SCREENING MAMMOGRAM: Primary | ICD-10-CM

## 2022-03-27 PROBLEM — Z12.31 ENCOUNTER FOR SCREENING MAMMOGRAM FOR BREAST CANCER: Status: ACTIVE | Noted: 2022-03-27

## 2022-03-27 PROBLEM — Z91.89 INCREASED RISK OF BREAST CANCER: Status: ACTIVE | Noted: 2022-03-27

## 2022-03-27 NOTE — PROGRESS NOTES
Assessment/Plan:  NGE  PMB 10/20   Vaginal dryness - silicone lubricant, probable vaginal estrogen  Lifetime Tyrer-Natalia: 35 26 % of Breast Ca  HSV- 1 recurrence '20- episodic Rx  Osteopenia- BMD 10/18 T-1 8 Spine, FRAX 0 1/3 8% - no one addressed  CoTesting '17, '22  RTO 1 yr  SBE monthly  3 D Mammo- Strong FH of Breast Ca, S 42 w BRCA neg Ca  - overdue-planned for May  MRI 6/21 - nl T-C Lifetime risk of Breast Ca 39 64 % - rec 11/22  Ca 1,200 mg/d-  with 2,000 IU- 900 deficient  Exercise 5/wk - 3rd stress Fx, walks daily  Colonoscopy- '19 nl , repeat '29       Diagnoses and all orders for this visit:    Encounter for annual routine gynecological examination    Encounter for screening mammogram for breast cancer    Increased risk of breast cancer  -     MRI breast bilateral w and wo contrast w cad; Future    Dyspareunia in female  -     estradiol (ESTRACE) 0 1 mg/g vaginal cream; Insert 1 g into the vagina 2 (two) times a week    Atrophic vaginitis  -     estradiol (ESTRACE) 0 1 mg/g vaginal cream; Insert 1 g into the vagina 2 (two) times a week    S/P total hysterectomy and BSO (bilateral salpingo-oophorectomy)    Complex endometrial hyperplasia    History of herpes genitalis              Subjective:        Patient ID: Shaka Rodriguez is a 46 y o  female  Susan presents today for her yearly evaluation  She is without any complaints  She had been using Estrace vaginal cream 3 times a week and still notice some discomfort until using a lubricant  Things are better now  She will switch to twice a week  Lifetime risk of breast cancer is 39%  Breast MRI was recommended again  She is compliant with calcium  However she takes a supplement along with a multivitamin and a glass of milk at the same time  She will divide the calcium to 3 different times a day        The following portions of the patient's history were reviewed and updated as appropriate: She  has a past medical history of Migraine, PONV (postoperative nausea and vomiting), and Stress fracture of foot  Patient Active Problem List    Diagnosis Date Noted    Encounter for screening mammogram for breast cancer 2022    Increased risk of breast cancer 2022    S/P total hysterectomy and BSO (bilateral salpingo-oophorectomy) 2021    Complex endometrial hyperplasia 2021    Atrophic vaginitis 2021    Abnormal ultrasound of uterus 2021    Intramural leiomyoma of uterus 2021    Dyspareunia in female 2021    History of herpes genitalis 10/31/2018    Encounter for annual routine gynecological examination 10/30/2018    Encounter for screening mammogram for malignant neoplasm of breast 10/30/2018   PMH:  ; C/S [address] , Marginal Abruption, PML- Stefan Champagne 3# 1 oz  3/00;  27 wks, 30% Abruption/PML  Shea 2-11, both had 1st trimester bleeding  LTL   Chronic Constipation/ Rectal fissures  Hemorrhoidectomy '  Migraines  HSV 1&2 '  AUB 10/15 EMB, sounded 7 cm, proliferative      R Foot- 2 Stress Fx's - power walking      R Foot -3rd Stress Fx  ; BMD 10/18      R Breast Bx (2)       L Ripped Retina  laser      PMB 10/20      Dyspareunia '      PMB - end of       PMB, Cervical Stenosis, US 2 mm ES with 1 mm cystic area, 3 5 cm intramural fibroid   - failed attempt at EMB 3/21, planned observation, repeat US      2nd Episode of PMB   - [address], D&C - Arcuate uterus, synechiae  - Complex endometrial hyperplasia with extensive breakdown, unable to determine atypia      TLH-BSO, Cysto - no atypia, nl adnexa       AV, Hx of Dyspareunia - now able to start Vaginal Estrogen   She  has a past surgical history that includes Breast lumpectomy; US guided breast biopsy right complete (Right, 2018); US guidance breast biopsy right each additional (Right, 2018); Breast biopsy (Right, 2018); pr hysteroscopy,w/endo bx (N/A, 9/10/2021);  Dilation and curettage of uterus; Tubal ligation; Hemorroidectomy; pr cystourethroscopy (N/A, 12/17/2021); and Hysterectomy (Bilateral, 12/17/2021)  Her family history includes BRCA1 Negative in her sister; BRCA2 Negative in her sister; Breast cancer (age of onset: 37) in her sister; Heart attack in her father and paternal grandfather; No Known Problems in her daughter, maternal aunt, mother, paternal aunt, sister, and son  FH:  S- R breast cancer 41- double mastectomy with chemotherapy and radiation 40, BRCA neg  MC's- Breast Ca 53, 28  MGGM- Breast Ca  F d 46 MI  PGF- d 46 MI   She  reports that she has never smoked  She has never used smokeless tobacco  She reports current alcohol use  She reports that she does not use drugs  SH:  Marrried  Deborah Art is doing well  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[employer], she has a desk job  Jack  is  19  and at  [employer] at [employer] for a year  Shea is 19, alvaro at [employer]  Current Outpatient Medications   Medication Sig Dispense Refill    aspirin-acetaminophen-caffeine (EXCEDRIN MIGRAINE) 250-250-65 MG per tablet Take by mouth As needed       Calcium Carb-Cholecalciferol (CALCIUM 600-D PO) Take 1 caplet by mouth every other day        eletriptan (RELPAX) 40 MG tablet Take one tablet at onset of migraine    One tablet 2 hours later PRN      estradiol (ESTRACE) 0 1 mg/g vaginal cream Insert 1 g into the vagina 2 (two) times a week 42 5 g 3    Multiple Vitamin (MULTIVITAMIN) tablet Take 1 tablet by mouth daily       No current facility-administered medications for this visit       Current Outpatient Medications on File Prior to Visit   Medication Sig    aspirin-acetaminophen-caffeine (EXCEDRIN MIGRAINE) 250-250-65 MG per tablet Take by mouth As needed     Calcium Carb-Cholecalciferol (CALCIUM 600-D PO) Take 1 caplet by mouth every other day      eletriptan (RELPAX) 40 MG tablet Take one tablet at onset of migraine    One tablet 2 hours later PRN    Multiple Vitamin (MULTIVITAMIN) tablet Take 1 tablet by mouth daily    [DISCONTINUED] estradiol (ESTRACE VAGINAL) 0 1 mg/g vaginal cream Insert 1 g into the vagina 3 (three) times a week     No current facility-administered medications on file prior to visit  She is allergic to other       Review of Systems   Constitutional: Negative for activity change, appetite change, fatigue and unexpected weight change  Eyes: Negative for visual disturbance  Respiratory: Negative for cough, chest tightness, shortness of breath and wheezing  Cardiovascular: Negative for chest pain, palpitations and leg swelling  Breast: Patient denies tenderness, nipple discharge, masses, or erythema  Gastrointestinal: Negative for abdominal distention, abdominal pain, blood in stool, constipation, diarrhea, nausea and vomiting  Endocrine: Negative for cold intolerance and heat intolerance  Genitourinary: Negative for decreased urine volume, difficulty urinating, dyspareunia, dysuria, frequency, hematuria, menstrual problem, pelvic pain, urgency, vaginal bleeding, vaginal discharge and vaginal pain  Musculoskeletal: Negative for arthralgias  Skin: Negative for rash  Neurological: Negative for weakness, light-headedness, numbness and headaches  Hematological: Does not bruise/bleed easily  Psychiatric/Behavioral: Negative for agitation, behavioral problems and sleep disturbance  The patient is not nervous/anxious  Objective:    Vitals:    03/28/22 0822   BP: 116/60   Weight: 81 2 kg (179 lb)   Height: 5' 10" (1 778 m)            Physical Exam  Vitals and nursing note reviewed  Constitutional:       Appearance: She is well-developed  HENT:      Head: Normocephalic and atraumatic  Eyes:      General: No scleral icterus  Right eye: No discharge  Left eye: No discharge  Extraocular Movements: Extraocular movements intact  Conjunctiva/sclera: Conjunctivae normal       Pupils: Pupils are equal, round, and reactive to light  Neck:      Thyroid: No thyromegaly  Trachea: No tracheal deviation  Cardiovascular:      Rate and Rhythm: Normal rate and regular rhythm  Heart sounds: Normal heart sounds  No murmur heard  Pulmonary:      Effort: Pulmonary effort is normal  No respiratory distress  Breath sounds: Normal breath sounds  No wheezing  Chest:   Breasts: Breasts are symmetrical       Right: No inverted nipple, mass, nipple discharge, skin change or tenderness  Left: No inverted nipple, mass, nipple discharge, skin change or tenderness  Abdominal:      General: Bowel sounds are normal  There is no distension  Palpations: Abdomen is soft  There is no mass  Tenderness: There is no abdominal tenderness  There is no right CVA tenderness or left CVA tenderness  Genitourinary:     General: Normal vulva  Labia:         Right: No rash, tenderness or lesion  Left: No rash, tenderness or lesion  Vagina: Normal       Adnexa:         Right: No mass, tenderness or fullness  Left: No mass, tenderness or fullness  Rectum: No external hemorrhoid  Comments: Urethral meatus within normal limits  Perineum within normal limits  Bladder well supported  Uterus and cervix surgically removed  Musculoskeletal:         General: Normal range of motion  Cervical back: Normal range of motion and neck supple  Skin:     General: Skin is warm and dry  Neurological:      Mental Status: She is alert and oriented to person, place, and time  Psychiatric:         Mood and Affect: Mood normal          Behavior: Behavior normal          Thought Content:  Thought content normal          Judgment: Judgment normal

## 2022-03-28 ENCOUNTER — ANNUAL EXAM (OUTPATIENT)
Dept: OBGYN CLINIC | Facility: CLINIC | Age: 52
End: 2022-03-28
Payer: COMMERCIAL

## 2022-03-28 VITALS
SYSTOLIC BLOOD PRESSURE: 116 MMHG | BODY MASS INDEX: 25.62 KG/M2 | HEIGHT: 70 IN | WEIGHT: 179 LBS | DIASTOLIC BLOOD PRESSURE: 60 MMHG

## 2022-03-28 DIAGNOSIS — N94.10 DYSPAREUNIA IN FEMALE: ICD-10-CM

## 2022-03-28 DIAGNOSIS — Z90.79 S/P TOTAL HYSTERECTOMY AND BSO (BILATERAL SALPINGO-OOPHORECTOMY): ICD-10-CM

## 2022-03-28 DIAGNOSIS — Z90.722 S/P TOTAL HYSTERECTOMY AND BSO (BILATERAL SALPINGO-OOPHORECTOMY): ICD-10-CM

## 2022-03-28 DIAGNOSIS — Z01.419 ENCOUNTER FOR ANNUAL ROUTINE GYNECOLOGICAL EXAMINATION: Primary | ICD-10-CM

## 2022-03-28 DIAGNOSIS — Z91.89 INCREASED RISK OF BREAST CANCER: ICD-10-CM

## 2022-03-28 DIAGNOSIS — Z90.710 S/P TOTAL HYSTERECTOMY AND BSO (BILATERAL SALPINGO-OOPHORECTOMY): ICD-10-CM

## 2022-03-28 DIAGNOSIS — Z12.31 ENCOUNTER FOR SCREENING MAMMOGRAM FOR BREAST CANCER: ICD-10-CM

## 2022-03-28 DIAGNOSIS — N95.2 ATROPHIC VAGINITIS: ICD-10-CM

## 2022-03-28 DIAGNOSIS — Z86.19 HISTORY OF HERPES GENITALIS: ICD-10-CM

## 2022-03-28 DIAGNOSIS — N85.01 COMPLEX ENDOMETRIAL HYPERPLASIA: ICD-10-CM

## 2022-03-28 PROCEDURE — 99396 PREV VISIT EST AGE 40-64: CPT | Performed by: OBSTETRICS & GYNECOLOGY

## 2022-03-28 RX ORDER — ESTRADIOL 0.1 MG/G
1 CREAM VAGINAL 2 TIMES WEEKLY
Qty: 42.5 G | Refills: 3 | Status: SHIPPED | OUTPATIENT
Start: 2022-03-28

## 2022-05-11 ENCOUNTER — HOSPITAL ENCOUNTER (OUTPATIENT)
Dept: RADIOLOGY | Age: 52
Discharge: HOME | End: 2022-05-11
Payer: COMMERCIAL

## 2022-05-11 VITALS — WEIGHT: 173 LBS | HEIGHT: 70 IN | BODY MASS INDEX: 24.77 KG/M2

## 2022-05-11 DIAGNOSIS — Z12.31 VISIT FOR SCREENING MAMMOGRAM: ICD-10-CM

## 2022-05-11 PROCEDURE — 77063 BREAST TOMOSYNTHESIS BI: CPT

## 2022-05-11 PROCEDURE — 77067 SCR MAMMO BI INCL CAD: CPT

## 2023-04-28 PROBLEM — Z80.3 FAMILY HISTORY OF BREAST CANCER IN FIRST DEGREE RELATIVE: Status: ACTIVE | Noted: 2023-04-28

## 2023-04-28 NOTE — PROGRESS NOTES
Assessment/Plan:  NGE  S/p TLH-BSO    AV/Dyspareunia-resumption of Estrace , 1 g HS twice weekly  Silicone lubricant as well  Lifetime Tyrer-Natalia: 35 26 % of Breast Ca  HSV- 1 recurrence '20- episodic Rx  Osteopenia- BMD 10/18 T-1 8 Spine, FRAX 0 1/3 8% - no one addressed  CoTesting NA  RTO 1 yr  SBE monthly  3 D Mammo- Strong FH of Breast Ca, S 40 w BRCA neg Ca  -   MRI 6/21 - nl T-C Lifetime risk of Breast Ca 39 64 %, decreased to 24 95 % 5/22    - rec 11/23  Ca 1,200 mg/d-  with 2,000 IU- 900 deficient  Exercise 5/wk - 3rd stress Fx, walks daily  Colonoscopy- '19 nl , repeat '29       Diagnoses and all orders for this visit:    Encounter for annual routine gynecological examination    Encounter for screening mammogram for breast cancer  -     Mammo screening bilateral w 3d & cad; Future    Increased risk of breast cancer  -     MRI breast bilateral w and wo contrast w cad; Future    Family history of breast cancer in first degree relative  -     MRI breast bilateral w and wo contrast w cad; Future    Dyspareunia in female  -     estradiol (ESTRACE) 0 1 mg/g vaginal cream; Insert 1 g into the vagina 2 (two) times a week    Atrophic vaginitis  -     estradiol (ESTRACE) 0 1 mg/g vaginal cream; Insert 1 g into the vagina 2 (two) times a week    S/P total hysterectomy and BSO (bilateral salpingo-oophorectomy)    History of herpes genitalis    Other orders  -     ciclopirox (PENLAC) 8 % solution              Subjective:        Patient ID: Amelia Blair is a 45 y o  female  Susan presents today for a yearly evaluation  She is complaining of a recurrent painful sex  She was only using the Estrace once a week and it was not working so she stopped it  Within the past 2 weeks she reinitiated it after another episode of painful sex  They do use a lubricant as well  The pain has greatly diminished their attempts at trying to have intercourse  In January she had some vaginal spotting    She does not think it was related to intercourse  She is aware of her increased risk of breast cancer but does not perform self breast awareness  She is due for a mammogram and an MRI is recommended in 6 months  She is receiving Botox for facial spasms every 3 months  Both her children will be graduating college next week  The following portions of the patient's history were reviewed and updated as appropriate: She  has a past medical history of Facial spasm, Migraine, PONV (postoperative nausea and vomiting), and Stress fracture of foot  Patient Active Problem List    Diagnosis Date Noted    Family history of breast cancer in first degree relative 2023    Encounter for screening mammogram for breast cancer 2022    Increased risk of breast cancer 2022    S/P total hysterectomy and BSO (bilateral salpingo-oophorectomy) 2021    Complex endometrial hyperplasia 2021    Atrophic vaginitis 2021    Abnormal ultrasound of uterus 2021    Intramural leiomyoma of uterus 2021    Dyspareunia in female 2021    History of herpes genitalis 10/31/2018    Encounter for annual routine gynecological examination 10/30/2018    Encounter for screening mammogram for malignant neoplasm of breast 10/30/2018   PMH:  ; C/S Breech 28 , Marginal Abruption, PML- [place] 3# 1 oz  3/00;  27 wks, 30% Abruption/PML  Shea 2-11, both had 1st trimester bleeding  LTL   Chronic Constipation/ Rectal fissures  Hemorrhoidectomy '  Migraines  HSV 1&2 '  AUB 10/15 EMB, sounded 7 cm, proliferative      R Foot- 2 Stress Fx's - power walking      R Foot -3rd Stress Fx  ;  BMD 10/18      R Breast Bx (2)       L Ripped Retina  laser      PMB 10/20      Dyspareunia       PMB - end of       PMB, Cervical Stenosis, US 2 mm ES with 1 mm cystic area, 3 5 cm intramural fibroid   - failed attempt at EMB 3/21, planned observation, repeat US      2nd Episode of PMB   - [facility], D&C - Arcuate uterus, synechiae 9/21 - Complex endometrial hyperplasia with extensive breakdown, unable to determine atypia      TLH-BSO, Cysto - no atypia, nl adnexa 12/21        Covid 12/21       Facial Spasms - Neurology, Botox '21       AV, Hx of Dyspareunia - restarted Vaginal Estrogen 2/22  She  has a past surgical history that includes US guided breast biopsy right complete (Right, 06/11/2018); US guidance breast biopsy right each additional (Right, 06/11/2018); Breast biopsy (Right, 06/11/2018); pr hysteroscopy bx endometrium&/polypc w/wo d&c (N/A, 09/10/2021); Dilation and curettage of uterus; Tubal ligation; Hemorroidectomy; pr cystourethroscopy (N/A, 12/17/2021); Hysterectomy (Bilateral, 12/17/2021); and Oophorectomy  Her family history includes BRCA1 Negative in her sister; BRCA2 Negative in her sister; Breast cancer (age of onset: 37) in her sister; Heart attack in her father and paternal grandfather; No Known Problems in her daughter, maternal aunt, mother, paternal aunt, sister, and son  FH:  S- R breast cancer 41- double mastectomy with chemotherapy and radiation 40, BRCA neg  MC's- Breast Ca 53, 26  MGGM- Breast Ca  F d 46 MI  PGF- d 46 MI   She  reports that she has never smoked  She has never used smokeless tobacco  She reports current alcohol use  She reports that she does not use drugs  SH:  Marrried  Angella Virk is doing well  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 [facility] [employer]  Playing club [employer]  They will both be graduating 5/23    Current Outpatient Medications   Medication Sig Dispense Refill    aspirin-acetaminophen-caffeine (EXCEDRIN MIGRAINE) 250-250-65 MG per tablet Take by mouth As needed       Calcium Carb-Cholecalciferol (CALCIUM 600-D PO) Take 1 caplet by mouth every other day        ciclopirox (PENLAC) 8 % solution       eletriptan (RELPAX) 40 MG tablet Take one tablet at onset of migraine    One tablet 2 hours later PRN      estradiol (ESTRACE) 0 1 mg/g vaginal cream Insert 1 g into the vagina 2 (two) times a week 42 5 g 3    Multiple Vitamin (MULTIVITAMIN) tablet Take 1 tablet by mouth daily       No current facility-administered medications for this visit  Current Outpatient Medications on File Prior to Visit   Medication Sig    aspirin-acetaminophen-caffeine (EXCEDRIN MIGRAINE) 250-250-65 MG per tablet Take by mouth As needed     Calcium Carb-Cholecalciferol (CALCIUM 600-D PO) Take 1 caplet by mouth every other day      ciclopirox (PENLAC) 8 % solution     eletriptan (RELPAX) 40 MG tablet Take one tablet at onset of migraine    One tablet 2 hours later PRN    Multiple Vitamin (MULTIVITAMIN) tablet Take 1 tablet by mouth daily    [DISCONTINUED] estradiol (ESTRACE) 0 1 mg/g vaginal cream Insert 1 g into the vagina 2 (two) times a week     No current facility-administered medications on file prior to visit  She is allergic to other       Review of Systems   Constitutional: Negative for activity change, appetite change, fatigue and unexpected weight change  Eyes: Negative for visual disturbance  Respiratory: Negative for cough, chest tightness, shortness of breath and wheezing  Cardiovascular: Negative for chest pain, palpitations and leg swelling  Breast: Patient denies tenderness, nipple discharge, masses, or erythema  Gastrointestinal: Negative for abdominal distention, abdominal pain, blood in stool, constipation, diarrhea, nausea and vomiting  Endocrine: Negative for cold intolerance and heat intolerance  Genitourinary: Negative for decreased urine volume, difficulty urinating, dyspareunia, dysuria, frequency, hematuria, menstrual problem, pelvic pain, urgency, vaginal bleeding, vaginal discharge and vaginal pain  Musculoskeletal: Negative for arthralgias  Skin: Negative for rash     Neurological: Negative for weakness, light-headedness, numbness and "headaches  Hematological: Does not bruise/bleed easily  Psychiatric/Behavioral: Negative for agitation, behavioral problems and sleep disturbance  The patient is not nervous/anxious  Objective:    Vitals:    05/01/23 0820   BP: 104/62   BP Location: Right arm   Patient Position: Sitting   Cuff Size: Standard   Weight: 80 6 kg (177 lb 9 6 oz)   Height: 5' 9\" (1 753 m)            Physical Exam  Vitals and nursing note reviewed  Exam conducted with a chaperone present  Constitutional:       Appearance: She is well-developed  HENT:      Head: Normocephalic and atraumatic  Eyes:      General: No scleral icterus  Right eye: No discharge  Left eye: No discharge  Extraocular Movements: Extraocular movements intact  Conjunctiva/sclera: Conjunctivae normal    Neck:      Thyroid: No thyromegaly  Trachea: No tracheal deviation  Cardiovascular:      Rate and Rhythm: Normal rate and regular rhythm  Heart sounds: Normal heart sounds  No murmur heard  Pulmonary:      Effort: Pulmonary effort is normal  No respiratory distress  Breath sounds: Normal breath sounds  No wheezing  Chest:   Breasts:     Breasts are symmetrical       Right: No inverted nipple, mass, nipple discharge, skin change or tenderness  Left: No inverted nipple, mass, nipple discharge, skin change or tenderness  Abdominal:      General: Bowel sounds are normal  There is no distension  Palpations: Abdomen is soft  There is no mass  Tenderness: There is no abdominal tenderness  Genitourinary:     General: Normal vulva  Labia:         Right: No rash, tenderness or lesion  Left: No rash, tenderness or lesion  Vagina: Normal       Adnexa:         Right: No mass, tenderness or fullness  Left: No mass, tenderness or fullness  Rectum: No external hemorrhoid  Comments: Urethral meatus within normal limits  Perineum within normal limits    Bladder well " supported  Uterus and cervix surgically removed  Physiologic vaginal atrophy  Vaginal depth 9 cm  Musculoskeletal:         General: Normal range of motion  Cervical back: Normal range of motion and neck supple  Skin:     General: Skin is warm and dry  Neurological:      Mental Status: She is alert and oriented to person, place, and time  Psychiatric:         Mood and Affect: Mood normal          Behavior: Behavior normal          Thought Content:  Thought content normal          Judgment: Judgment normal

## 2023-05-01 ENCOUNTER — ANNUAL EXAM (OUTPATIENT)
Dept: OBGYN CLINIC | Facility: CLINIC | Age: 53
End: 2023-05-01

## 2023-05-01 VITALS
SYSTOLIC BLOOD PRESSURE: 104 MMHG | DIASTOLIC BLOOD PRESSURE: 62 MMHG | BODY MASS INDEX: 26.31 KG/M2 | WEIGHT: 177.6 LBS | HEIGHT: 69 IN

## 2023-05-01 DIAGNOSIS — Z90.79 S/P TOTAL HYSTERECTOMY AND BSO (BILATERAL SALPINGO-OOPHORECTOMY): ICD-10-CM

## 2023-05-01 DIAGNOSIS — Z12.31 ENCOUNTER FOR SCREENING MAMMOGRAM FOR BREAST CANCER: ICD-10-CM

## 2023-05-01 DIAGNOSIS — Z86.19 HISTORY OF HERPES GENITALIS: ICD-10-CM

## 2023-05-01 DIAGNOSIS — Z90.710 S/P TOTAL HYSTERECTOMY AND BSO (BILATERAL SALPINGO-OOPHORECTOMY): ICD-10-CM

## 2023-05-01 DIAGNOSIS — Z80.3 FAMILY HISTORY OF BREAST CANCER IN FIRST DEGREE RELATIVE: ICD-10-CM

## 2023-05-01 DIAGNOSIS — Z90.722 S/P TOTAL HYSTERECTOMY AND BSO (BILATERAL SALPINGO-OOPHORECTOMY): ICD-10-CM

## 2023-05-01 DIAGNOSIS — N95.2 ATROPHIC VAGINITIS: ICD-10-CM

## 2023-05-01 DIAGNOSIS — N94.10 DYSPAREUNIA IN FEMALE: ICD-10-CM

## 2023-05-01 DIAGNOSIS — Z91.89 INCREASED RISK OF BREAST CANCER: ICD-10-CM

## 2023-05-01 DIAGNOSIS — Z01.419 ENCOUNTER FOR ANNUAL ROUTINE GYNECOLOGICAL EXAMINATION: Primary | ICD-10-CM

## 2023-05-01 RX ORDER — ESTRADIOL 0.1 MG/G
1 CREAM VAGINAL 2 TIMES WEEKLY
Qty: 42.5 G | Refills: 3 | Status: SHIPPED | OUTPATIENT
Start: 2023-05-01

## 2023-05-23 ENCOUNTER — HOSPITAL ENCOUNTER (OUTPATIENT)
Dept: RADIOLOGY | Facility: MEDICAL CENTER | Age: 53
Discharge: HOME | End: 2023-05-23

## 2023-05-23 VITALS — WEIGHT: 177 LBS | BODY MASS INDEX: 26.22 KG/M2 | HEIGHT: 69 IN

## 2023-05-23 DIAGNOSIS — Z12.31 ENCOUNTER FOR SCREENING MAMMOGRAM FOR BREAST CANCER: ICD-10-CM

## 2024-02-21 PROBLEM — Z01.419 ENCOUNTER FOR ANNUAL ROUTINE GYNECOLOGICAL EXAMINATION: Status: RESOLVED | Noted: 2018-10-30 | Resolved: 2024-02-21

## 2024-03-15 ENCOUNTER — TELEPHONE (OUTPATIENT)
Age: 54
End: 2024-03-15

## 2024-03-15 NOTE — TELEPHONE ENCOUNTER
Pt called in to advise that the MRI breast order need to be extended or renewed because central scheduling could not fit pt in sooner in April 7th, and  central scheduling advised pt to get a new order for MRI breast.

## 2024-03-19 DIAGNOSIS — Z91.89 INCREASED RISK OF BREAST CANCER: ICD-10-CM

## 2024-03-19 DIAGNOSIS — Z80.3 FAMILY HISTORY OF BREAST CANCER IN FIRST DEGREE RELATIVE: Primary | ICD-10-CM

## 2024-03-19 NOTE — TELEPHONE ENCOUNTER
Patient called the RX Refill Line. Message is being forwarded to the office.     Patient states she called last week regarding MRI script. She sounded very frustrated, central scheduling would not schedule her appointment after April 7th and she cannot do it sooner than April 7. Patient states central scheduling is requesting a date change for the expected/expiration date to be pushed out at least 6 more months that way patient can plan accordingly.     Please contact patient at [phone]    Okay to send [employer] message once completed per patient.

## 2024-05-16 PROBLEM — Z01.419 ENCOUNTER FOR ANNUAL ROUTINE GYNECOLOGICAL EXAMINATION: Status: ACTIVE | Noted: 2024-05-16

## 2024-05-16 NOTE — PROGRESS NOTES
Assessment/Plan:  NGE  S/p TLH-BSO    AV/Dyspareunia-resumption of Estrace , 1 g HS twice weekly.  Jumpstart with every other day for the first month.  After 3 months attempt vaginal dilators until 1 approximate the size of her 's penis.  Hyalo- GYN and Astroglide [silicone] lubricant were also explained.  She knows she should have come in earlier to discuss this.  Lifetime Tyrer-Cuzick: 35.26 % of Breast Ca  HSV- 1 recurrence '20- episodic Rx  Osteopenia- BMD 10/18 T-1.8 Spine, FRAX 0.1/3.8% - referred to PCP for continuity   CoTesting NA  RTO 1 yr.  SBE monthly  3 D Mammo- Strong FH of Breast Ca, S 42 w BRCA neg Ca  -   MRI 6/21 - nl T-C Lifetime risk of Breast Ca 39.64 %, decreased to 24.95 % 5/22    - rec 11/23.  Last done 6/21.  This and mammogram now scheduled  Ca 1,200 mg/d-  with 2,000 IU- 900 deficient  Exercise 5/wk - 3rd stress Fx, walks daily.  Colonoscopy- '19 nl , repeat '29       Diagnoses and all orders for this visit:    Encounter for annual routine gynecological examination    Encounter for screening mammogram for breast cancer    Increased risk of breast cancer    Family history of breast cancer in first degree relative    Atrophic vaginitis  -     estradiol (ESTRACE) 0.1 mg/g vaginal cream; Insert 1 g into the vagina 2 (two) times a week    Dyspareunia in female  -     estradiol (ESTRACE) 0.1 mg/g vaginal cream; Insert 1 g into the vagina 2 (two) times a week    S/P total hysterectomy and BSO (bilateral salpingo-oophorectomy)              Subjective:        Patient ID: Susan Pacheco is a 53 y.o. female.    Susan returns for an annual visit.  She has not had intercourse for a number of months because it is too painful.  She has not used vaginal estrogen for approximately 9 months.  She was compliant using it twice a week for 3 months but did not notice any improvement.  They were using a lubricant.  However, it feels like he is hitting a brick wall.  Last year they vaginal depth was 9 cm.   I felt the depth was adequate.  They are remaining intimate in other ways but she would like to have vaginal intercourse.    She has an increased risk of breast cancer.  She does not perform self breast exams and she is late for both mammography and breast MRI.  Her breasts are also heterogeneously dense.        The following portions of the patient's history were reviewed and updated as appropriate: She  has a past medical history of Facial spasm, Migraine, PONV (postoperative nausea and vomiting), and Stress fracture of foot.  Patient Active Problem List    Diagnosis Date Noted    Encounter for annual routine gynecological examination 2024    Family history of breast cancer in first degree relative 2023    Encounter for screening mammogram for breast cancer 2022    Increased risk of breast cancer 2022    S/P total hysterectomy and BSO (bilateral salpingo-oophorectomy) 2021    Complex endometrial hyperplasia 2021    Atrophic vaginitis 2021    Abnormal ultrasound of uterus 2021    Intramural leiomyoma of uterus 2021    Dyspareunia in female 2021    History of herpes genitalis 10/31/2018    Encounter for screening mammogram for malignant neoplasm of breast 10/30/2018   PMH:  ; C/S Breech 28 , Marginal Abruption, PML- Sam 3# 1 oz. 3/00;  27 wks, 30% Abruption/PML  Shea 2-11, both had 1st trimester bleeding.  LTL   Chronic Constipation/ Rectal fissures  Hemorrhoidectomy '  Migraines  HSV 1&2 '  AUB 10/15 EMB, sounded 7 cm, proliferative      R Foot- 2 Stress Fx's - power walking      R Foot -3rd Stress Fx  ; BMD 10/18      R Breast Bx (2)       L Ripped Retina  laser      PMB 10/20      Dyspareunia '      PMB - end of       PMB, Cervical Stenosis, US 2 mm ES with 1 mm cystic area, 3.5 cm intramural fibroid   - failed attempt at EMB 3/21, planned observation, repeat US      2nd Episode of PMB   - [facility], D&C  - Arcuate uterus, synechiae 9/21 - Complex endometrial hyperplasia with extensive breakdown, unable to determine atypia      TLH-BSO, Cysto - no atypia, nl adnexa 12/21        Covid 12/21       Facial Spasms - Neurology, Botox '21       AV, Hx of Dyspareunia - restarted Vaginal Estrogen 2/22  She  has a past surgical history that includes US guided breast biopsy right complete (Right, 06/11/2018); US guidance breast biopsy right each additional (Right, 06/11/2018); Breast biopsy (Right, 06/11/2018); pr hysteroscopy bx endometrium&/polypc w/wo d&c (N/A, 09/10/2021); Dilation and curettage of uterus; Tubal ligation; Hemorroidectomy; pr cystourethroscopy (N/A, 12/17/2021); Hysterectomy (Bilateral, 12/17/2021); and Oophorectomy.  Her family history includes BRCA1 Negative in her sister; BRCA2 Negative in her sister; Breast cancer (age of onset: 43) in her sister; Heart attack in her father and paternal grandfather; No Known Problems in her daughter, maternal aunt, mother, paternal aunt, sister, and son.  FH:  S- R breast cancer 40- double mastectomy with chemotherapy and radiation 40, BRCA neg  MC's- Breast Ca 53, 35  MGGM- Breast Ca  F d 46 MI  PGF- d 52 MI  She  reports that she has never smoked. She has never used smokeless tobacco. She reports current alcohol use. She reports that she does not use drugs.  SH:  Marrried. Jose is doing well. 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 Jack [3/19/00]-  [employer] [place]. Shea - [employer]. Playing club volleyball. Both graduated the same day 5/23. 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  Current Outpatient Medications   Medication Sig Dispense Refill    aspirin-acetaminophen-caffeine (EXCEDRIN MIGRAINE) 250-250-65 MG per tablet Take by mouth As needed       [START ON 5/20/2024] estradiol (ESTRACE) 0.1 mg/g vaginal cream Insert 1 g into the vagina 2 (two) times a week 42.5 g 3     Multiple Vitamin (MULTIVITAMIN) tablet Take 1 tablet by mouth daily      Calcium Carb-Cholecalciferol (CALCIUM 600-D PO) Take 1 caplet by mouth every other day   (Patient not taking: Reported on 5/17/2024)      ciclopirox (PENLAC) 8 % solution  (Patient not taking: Reported on 5/17/2024)      eletriptan (RELPAX) 40 MG tablet Take one tablet at onset of migraine.. One tablet 2 hours later PRN (Patient not taking: Reported on 5/17/2024)      estradiol (ESTRACE) 0.1 mg/g vaginal cream Insert 1 g into the vagina 2 (two) times a week (Patient not taking: Reported on 5/17/2024) 42.5 g 3     No current facility-administered medications for this visit.     Current Outpatient Medications on File Prior to Visit   Medication Sig    aspirin-acetaminophen-caffeine (EXCEDRIN MIGRAINE) 250-250-65 MG per tablet Take by mouth As needed     Multiple Vitamin (MULTIVITAMIN) tablet Take 1 tablet by mouth daily    Calcium Carb-Cholecalciferol (CALCIUM 600-D PO) Take 1 caplet by mouth every other day   (Patient not taking: Reported on 5/17/2024)    ciclopirox (PENLAC) 8 % solution  (Patient not taking: Reported on 5/17/2024)    eletriptan (RELPAX) 40 MG tablet Take one tablet at onset of migraine.. One tablet 2 hours later PRN (Patient not taking: Reported on 5/17/2024)    estradiol (ESTRACE) 0.1 mg/g vaginal cream Insert 1 g into the vagina 2 (two) times a week (Patient not taking: Reported on 5/17/2024)     No current facility-administered medications on file prior to visit.     She is allergic to other..    Review of Systems   Constitutional:  Negative for activity change, appetite change, fatigue and unexpected weight change.   Eyes:  Negative for visual disturbance.   Respiratory:  Negative for cough, chest tightness, shortness of breath and wheezing.    Cardiovascular:  Negative for chest pain, palpitations and leg swelling.        Breast: Patient denies tenderness, nipple discharge, masses, or erythema.   Gastrointestinal:   "Negative for abdominal distention, abdominal pain, blood in stool, constipation, diarrhea, nausea and vomiting.   Endocrine: Negative for cold intolerance and heat intolerance.   Genitourinary:  Positive for dyspareunia. Negative for decreased urine volume, difficulty urinating, dysuria, frequency, hematuria, menstrual problem, pelvic pain, urgency, vaginal bleeding, vaginal discharge and vaginal pain.        [employer] precluded by pain.  No incontinence.   Musculoskeletal:  Negative for arthralgias.   Skin:  Negative for rash.   Neurological:  Negative for weakness, light-headedness, numbness and headaches.   Hematological:  Does not bruise/bleed easily.   Psychiatric/Behavioral:  Negative for agitation, behavioral problems and sleep disturbance. The patient is nervous/anxious.          Objective:    Vitals:    05/17/24 0739   BP: 102/64   BP Location: Left arm   Patient Position: Sitting   Cuff Size: Standard   Weight: 78.1 kg (172 lb 3.2 oz)   Height: 5' 9.5\" (1.765 m)            Physical Exam  Vitals and nursing note reviewed. Exam conducted with a chaperone present.   Constitutional:       Appearance: She is well-developed.   HENT:      Head: Normocephalic and atraumatic.   Eyes:      General: No scleral icterus.        Right eye: No discharge.         Left eye: No discharge.      Extraocular Movements: Extraocular movements intact.      Conjunctiva/sclera: Conjunctivae normal.   Neck:      Thyroid: No thyromegaly.      Trachea: No tracheal deviation.   Cardiovascular:      Rate and Rhythm: Normal rate and regular rhythm.      Heart sounds: Normal heart sounds. No murmur heard.  Pulmonary:      Effort: Pulmonary effort is normal. No respiratory distress.      Breath sounds: Normal breath sounds. No wheezing.   Chest:   Breasts:     Breasts are symmetrical.      Right: No inverted nipple, mass, nipple discharge, skin change or tenderness.      Left: No inverted nipple, mass, nipple discharge, skin change or " tenderness.   Abdominal:      General: Bowel sounds are normal. There is no distension.      Palpations: Abdomen is soft. There is no mass.      Tenderness: There is no abdominal tenderness.   Genitourinary:     General: Normal vulva.      Labia:         Right: No rash, tenderness or lesion.         Left: No rash, tenderness or lesion.       Vagina: Normal.      Adnexa:         Right: No mass, tenderness or fullness.          Left: No mass, tenderness or fullness.        Rectum: No external hemorrhoid.      Comments: Urethral meatus within normal limits.  Perineum within normal limits.  Bladder well supported.  Uterus and cervix surgically removed.  Physiologic vaginal atrophy.  Vaginal depth 9 cm.  Musculoskeletal:         General: Normal range of motion.      Cervical back: Normal range of motion and neck supple.   Skin:     General: Skin is warm and dry.   Neurological:      Mental Status: She is alert and oriented to person, place, and time.   Psychiatric:         Mood and Affect: Mood normal.         Behavior: Behavior normal.         Thought Content: Thought content normal.         Judgment: Judgment normal.

## 2024-05-17 ENCOUNTER — ANNUAL EXAM (OUTPATIENT)
Dept: OBGYN CLINIC | Facility: CLINIC | Age: 54
End: 2024-05-17
Payer: COMMERCIAL

## 2024-05-17 VITALS
SYSTOLIC BLOOD PRESSURE: 102 MMHG | WEIGHT: 172.2 LBS | DIASTOLIC BLOOD PRESSURE: 64 MMHG | BODY MASS INDEX: 24.65 KG/M2 | HEIGHT: 70 IN

## 2024-05-17 DIAGNOSIS — Z90.710 S/P TOTAL HYSTERECTOMY AND BSO (BILATERAL SALPINGO-OOPHORECTOMY): ICD-10-CM

## 2024-05-17 DIAGNOSIS — Z90.79 S/P TOTAL HYSTERECTOMY AND BSO (BILATERAL SALPINGO-OOPHORECTOMY): ICD-10-CM

## 2024-05-17 DIAGNOSIS — Z12.31 ENCOUNTER FOR SCREENING MAMMOGRAM FOR BREAST CANCER: ICD-10-CM

## 2024-05-17 DIAGNOSIS — Z90.722 S/P TOTAL HYSTERECTOMY AND BSO (BILATERAL SALPINGO-OOPHORECTOMY): ICD-10-CM

## 2024-05-17 DIAGNOSIS — N95.2 ATROPHIC VAGINITIS: ICD-10-CM

## 2024-05-17 DIAGNOSIS — N94.10 DYSPAREUNIA IN FEMALE: ICD-10-CM

## 2024-05-17 DIAGNOSIS — Z01.419 ENCOUNTER FOR ANNUAL ROUTINE GYNECOLOGICAL EXAMINATION: Primary | ICD-10-CM

## 2024-05-17 DIAGNOSIS — Z91.89 INCREASED RISK OF BREAST CANCER: ICD-10-CM

## 2024-05-17 DIAGNOSIS — Z80.3 FAMILY HISTORY OF BREAST CANCER IN FIRST DEGREE RELATIVE: ICD-10-CM

## 2024-05-17 PROCEDURE — 99396 PREV VISIT EST AGE 40-64: CPT | Performed by: OBSTETRICS & GYNECOLOGY

## 2024-05-17 RX ORDER — ESTRADIOL 0.1 MG/G
1 CREAM VAGINAL 2 TIMES WEEKLY
Qty: 42.5 G | Refills: 3 | Status: SHIPPED | OUTPATIENT
Start: 2024-05-20

## 2024-06-15 PROBLEM — Z01.419 ENCOUNTER FOR ANNUAL ROUTINE GYNECOLOGICAL EXAMINATION: Status: RESOLVED | Noted: 2024-05-16 | Resolved: 2024-06-15

## 2024-06-26 ENCOUNTER — HOSPITAL ENCOUNTER (OUTPATIENT)
Dept: RADIOLOGY | Facility: HOSPITAL | Age: 54
Discharge: HOME | End: 2024-06-26
Attending: OBSTETRICS & GYNECOLOGY
Payer: COMMERCIAL

## 2024-06-26 DIAGNOSIS — Z80.3 FAMILY HISTORY OF BREAST CANCER IN FIRST DEGREE RELATIVE: ICD-10-CM

## 2024-06-26 DIAGNOSIS — Z91.89 INCREASED RISK OF BREAST CANCER: ICD-10-CM

## 2024-06-26 PROCEDURE — A9585 GADOBUTROL INJECTION: HCPCS | Performed by: OBSTETRICS & GYNECOLOGY

## 2024-06-26 PROCEDURE — C8908 MRI W/O FOL W/CONT, BREAST,: HCPCS

## 2024-06-26 PROCEDURE — C8937 CAD BREAST MRI: HCPCS

## 2024-06-26 RX ORDER — GADOBUTROL 604.72 MG/ML
7 INJECTION INTRAVENOUS
Status: COMPLETED | OUTPATIENT
Start: 2024-06-26 | End: 2024-06-26

## 2024-06-26 RX ADMIN — GADOBUTROL 7 ML: 604.72 INJECTION INTRAVENOUS at 17:29

## 2025-01-10 ENCOUNTER — HOSPITAL ENCOUNTER (OUTPATIENT)
Dept: RADIOLOGY | Facility: MEDICAL CENTER | Age: 55
Discharge: HOME | End: 2025-01-10
Payer: COMMERCIAL

## 2025-01-10 VITALS — WEIGHT: 172 LBS | HEIGHT: 70 IN | BODY MASS INDEX: 24.62 KG/M2

## 2025-01-10 DIAGNOSIS — Z12.31 ENCOUNTER FOR SCREENING MAMMOGRAM FOR MALIGNANT NEOPLASM OF BREAST: ICD-10-CM

## 2025-01-10 PROCEDURE — 77063 BREAST TOMOSYNTHESIS BI: CPT

## 2025-01-10 PROCEDURE — 77067 SCR MAMMO BI INCL CAD: CPT

## 2025-01-13 ENCOUNTER — RESULTS FOLLOW-UP (OUTPATIENT)
Dept: OTHER | Facility: HOSPITAL | Age: 55
End: 2025-01-13

## 2025-04-11 ENCOUNTER — TELEPHONE (OUTPATIENT)
Age: 55
End: 2025-04-11

## 2025-04-11 DIAGNOSIS — Z91.89 INCREASED RISK OF BREAST CANCER: Primary | ICD-10-CM

## 2025-04-11 DIAGNOSIS — Z80.3 FAMILY HISTORY OF BREAST CANCER IN FIRST DEGREE RELATIVE: ICD-10-CM

## 2025-05-29 ENCOUNTER — ANNUAL EXAM (OUTPATIENT)
Dept: OBGYN CLINIC | Facility: CLINIC | Age: 55
End: 2025-05-29

## 2025-05-29 VITALS
SYSTOLIC BLOOD PRESSURE: 122 MMHG | WEIGHT: 166 LBS | BODY MASS INDEX: 24.59 KG/M2 | DIASTOLIC BLOOD PRESSURE: 76 MMHG | HEIGHT: 69 IN

## 2025-05-29 DIAGNOSIS — Z01.419 ENCOUNTER FOR GYNECOLOGICAL EXAMINATION WITHOUT ABNORMAL FINDING: ICD-10-CM

## 2025-05-29 DIAGNOSIS — N94.10 DYSPAREUNIA IN FEMALE: ICD-10-CM

## 2025-05-29 DIAGNOSIS — Z12.31 ENCOUNTER FOR SCREENING MAMMOGRAM FOR MALIGNANT NEOPLASM OF BREAST: ICD-10-CM

## 2025-05-29 DIAGNOSIS — Z90.79 S/P TOTAL HYSTERECTOMY AND BSO (BILATERAL SALPINGO-OOPHORECTOMY): ICD-10-CM

## 2025-05-29 DIAGNOSIS — Z90.722 S/P TOTAL HYSTERECTOMY AND BSO (BILATERAL SALPINGO-OOPHORECTOMY): ICD-10-CM

## 2025-05-29 DIAGNOSIS — Z90.710 S/P TOTAL HYSTERECTOMY AND BSO (BILATERAL SALPINGO-OOPHORECTOMY): ICD-10-CM

## 2025-05-29 DIAGNOSIS — Z01.419 ENCOUNTER FOR ANNUAL ROUTINE GYNECOLOGICAL EXAMINATION: Primary | ICD-10-CM

## 2025-05-29 RX ORDER — CLONAZEPAM 0.5 MG/1
TABLET ORAL
COMMUNITY
Start: 2025-02-26

## 2025-05-29 NOTE — ASSESSMENT & PLAN NOTE
55 yo here for annual exam    Pap no longer indicated, s/p hysterectomy  Mammo 1/26, does MRI as well with elevated TC. Recommend breast self awareness.  Colonoscopy due 2029  DEXA due 2027, on ca and vit D  Sexually active with pain

## 2025-05-29 NOTE — PROGRESS NOTES
Name: Susan Pacheco      : 1970      MRN: [id number]  Encounter Provider: Sonya Huitron MD  Encounter Date: 2025   Encounter department: [facility]  :  Assessment & Plan  Encounter for annual routine gynecological examination         Encounter for screening mammogram for malignant neoplasm of breast    Orders:    Mammo screening bilateral w 3d and cad; Future    S/P total hysterectomy and BSO (bilateral salpingo-oophorectomy)         Dyspareunia in female  Tender left periurethral on pubic symphysis  Discussed trial of alternative positions  Remainder of exam without pain or tenderness  Discussed vaginal health is excellent  Reviewed mechanism for pain with intercourse prior to improvement in vaginal health during perimenopause when vagina is less lubricated and accomodating    Orders:    Ambulatory Referral to Physical Therapy; Future    Encounter for gynecological examination without abnormal finding  53 yo here for annual exam    Pap no longer indicated, s/p hysterectomy  Mammo , does MRI as well with elevated TC. Recommend breast self awareness.  Colonoscopy due   DEXA due , on ca and vit D  Sexually active with pain             History of Present Illness   HPI  Susan Pacheco is a 54 y.o. female who presents for annual exam. [facility] patient. No bleeding. No vulvar or breast concerns. No pelvic pain. She has persistent pain with intercourse. She has been very regular with estrace use since her last annual. Using recommended lubrication. Feels like they hit a wall or have burning.   History obtained from: patient    Review of Systems   Constitutional:  Negative for chills and fever.   HENT:  Negative for ear pain and sore throat.    Eyes:  Negative for pain and visual disturbance.   Respiratory:  Negative for cough and shortness of breath.    Cardiovascular:  Negative for chest pain and palpitations.   Gastrointestinal:  Negative for  "abdominal pain, constipation, diarrhea, nausea and vomiting.   Genitourinary:  Positive for dyspareunia. Negative for dysuria, frequency, hematuria, pelvic pain, urgency, vaginal bleeding, vaginal discharge and vaginal pain.   Musculoskeletal:  Negative for arthralgias and back pain.   Skin:  Negative for color change and rash.   Neurological:  Negative for seizures and syncope.   All other systems reviewed and are negative.    Medical History Reviewed by provider this encounter:     .     Objective   /76   Ht 5' 8.9\" (1.75 m)   Wt 75.3 kg (166 lb)   LMP 10/20/2020   BMI 24.59 kg/m²      Physical Exam  Vitals and nursing note reviewed.   Constitutional:       General: She is not in acute distress.     Appearance: She is well-developed.   HENT:      Head: Normocephalic and atraumatic.     Eyes:      Conjunctiva/sclera: Conjunctivae normal.       Cardiovascular:      Rate and Rhythm: Normal rate and regular rhythm.      Heart sounds: No murmur heard.  Pulmonary:      Effort: Pulmonary effort is normal. No respiratory distress.      Breath sounds: Normal breath sounds.   Chest:   Breasts:     Breasts are symmetrical.      Right: No swelling, bleeding, inverted nipple, mass, nipple discharge, skin change or tenderness.      Left: No swelling, bleeding, inverted nipple, mass, nipple discharge, skin change or tenderness.   Abdominal:      Palpations: Abdomen is soft.      Tenderness: There is no abdominal tenderness.   Genitourinary:     Labia:         Right: No rash, tenderness, lesion or injury.         Left: No rash, tenderness, lesion or injury.       Vagina: No vaginal discharge, erythema, tenderness or bleeding.      Cervix: No friability, lesion, erythema or cervical bleeding.      Uterus: Not enlarged, not fixed and not tender.       Adnexa: Right adnexa normal and left adnexa normal.        Right: No mass, tenderness or fullness.          Left: No mass, tenderness or fullness.        Comments: Pelvic " floor not overactive. Vaginal health consistent with estrace use. Rectovaginal exam without nodularity or masses.     Musculoskeletal:         General: No swelling.      Cervical back: Neck supple.   Lymphadenopathy:      Upper Body:      Right upper body: No supraclavicular, axillary or pectoral adenopathy.      Left upper body: No supraclavicular, axillary or pectoral adenopathy.     Skin:     General: Skin is warm and dry.      Capillary Refill: Capillary refill takes less than 2 seconds.     Neurological:      General: No focal deficit present.      Mental Status: She is alert. Mental status is at baseline.     Psychiatric:         Mood and Affect: Mood normal.         Behavior: Behavior normal.         Thought Content: Thought content normal.         Judgment: Judgment normal.         Administrative Statements   I have spent a total time of 20 minutes in caring for this patient on the day of the visit/encounter including Impressions, Counseling / Coordination of care, Documenting in the medical record, Reviewing/placing orders in the medical record (including tests, medications, and/or procedures), and Obtaining or reviewing history  .

## 2025-05-29 NOTE — ASSESSMENT & PLAN NOTE
Tender left periurethral on pubic symphysis  Discussed trial of alternative positions  Remainder of exam without pain or tenderness  Discussed vaginal health is excellent  Reviewed mechanism for pain with intercourse prior to improvement in vaginal health during perimenopause when vagina is less lubricated and accomodating    Orders:    Ambulatory Referral to Physical Therapy; Future

## 2025-06-05 DIAGNOSIS — N94.10 DYSPAREUNIA IN FEMALE: ICD-10-CM

## 2025-06-05 DIAGNOSIS — N95.2 ATROPHIC VAGINITIS: ICD-10-CM

## 2025-06-05 RX ORDER — ESTRADIOL 0.1 MG/G
1 CREAM VAGINAL 2 TIMES WEEKLY
Qty: 42.5 G | Refills: 1 | Status: SHIPPED | OUTPATIENT
Start: 2025-06-05

## 2025-06-05 NOTE — TELEPHONE ENCOUNTER
Reason for call:   [x] Refill   [] Prior Auth  [] Other:     Office:   [] PCP/Provider -   [x] Specialty/Provider -  [facility]  Authorized By:   Sonya Huitron MD      Medication:     estradiol (ESTRACE) 0.1 mg/g vaginal cream 1 g, 2 times weekly         Pharmacy: [facility] #[id number] - [place] - [id number] [phone]     Local Pharmacy   Does the patient have enough for 3 days?   [] Yes   [x] No - Send as HP to POD    Mail Away Pharmacy   Does the patient have enough for 10 days?   [] Yes   [] No - Send as HP to POD

## 2025-06-28 PROBLEM — Z01.419 ENCOUNTER FOR GYNECOLOGICAL EXAMINATION WITHOUT ABNORMAL FINDING: Status: RESOLVED | Noted: 2025-05-29 | Resolved: 2025-06-28

## 2025-07-07 ENCOUNTER — HOSPITAL ENCOUNTER (OUTPATIENT)
Dept: RADIOLOGY | Facility: HOSPITAL | Age: 55
Discharge: HOME | End: 2025-07-07
Attending: STUDENT IN AN ORGANIZED HEALTH CARE EDUCATION/TRAINING PROGRAM
Payer: COMMERCIAL

## 2025-07-07 DIAGNOSIS — Z91.89 INCREASED RISK OF BREAST CANCER: ICD-10-CM

## 2025-07-07 DIAGNOSIS — Z80.3 FAMILY HISTORY OF BREAST CANCER IN FIRST DEGREE RELATIVE: ICD-10-CM

## 2025-07-07 PROCEDURE — C8908 MRI W/O FOL W/CONT, BREAST,: HCPCS

## 2025-07-07 PROCEDURE — A9585 GADOBUTROL INJECTION: HCPCS | Performed by: STUDENT IN AN ORGANIZED HEALTH CARE EDUCATION/TRAINING PROGRAM

## 2025-07-07 RX ORDER — GADOBUTROL 604.72 MG/ML
7 INJECTION INTRAVENOUS
Status: COMPLETED | OUTPATIENT
Start: 2025-07-07 | End: 2025-07-07

## 2025-07-07 RX ADMIN — GADOBUTROL 7 ML: 604.72 INJECTION INTRAVENOUS at 19:03

## (undated) DEVICE — ENDOPATH 5MM ENDOSCOPIC BLUNT TIP DISSECTORS (12 POUCHES CONTAINING 3 DISSECTORS EACH): Brand: ENDOPATH

## (undated) DEVICE — CHLORAPREP HI-LITE 26ML ORANGE

## (undated) DEVICE — GLOVE INDICATOR PI UNDERGLOVE SZ 7 BLUE

## (undated) DEVICE — CYSTO TUBING SINGLE IRRIGATION

## (undated) DEVICE — STERILE CYSTO PACK: Brand: CARDINAL HEALTH

## (undated) DEVICE — ANTIBACTERIAL VIOLET BRAIDED (POLYGLACTIN 910), SYNTHETIC ABSORBABLE SUTURE: Brand: COATED VICRYL

## (undated) DEVICE — SUT MONOCRYL 4-0 PS-2 27 IN Y426H

## (undated) DEVICE — GLOVE PI ULTRA TOUCH SZ.7.0

## (undated) DEVICE — GLOVE INDICATOR PI UNDERGLOVE SZ 8 BLUE

## (undated) DEVICE — APPLICATOR ENDO SURGICEL

## (undated) DEVICE — STRL COTTON TIP APPLCTR 6IN PK: Brand: CARDINAL HEALTH

## (undated) DEVICE — ELECTRODE LAP SPATULA CRV E-Z CLEAN 33CM -0018C

## (undated) DEVICE — OCCLUDER COLPO-PNEUMO

## (undated) DEVICE — MAYO STAND COVER: Brand: CONVERTORS

## (undated) DEVICE — SCD SEQUENTIAL COMPRESSION COMFORT SLEEVE MEDIUM KNEE LENGTH: Brand: KENDALL SCD

## (undated) DEVICE — UNDER BUTTOCKS DRAPE W/FLUID CONTROL POUCH: Brand: CONVERTORS

## (undated) DEVICE — IRRIG ENDO FLO TUBING

## (undated) DEVICE — TUBING SMOKE EVAC W/FILTRATION DEVICE PLUMEPORT ACTIV

## (undated) DEVICE — DRAPE SHEET THREE QUARTER

## (undated) DEVICE — TRAY FOLEY 16FR URIMETER SILICONE SURESTEP

## (undated) DEVICE — ENSEAL X1 TISSUE SEALER, CURVED JAW, 37 CM SHAFT LENGTH: Brand: ENSEAL

## (undated) DEVICE — PREMIUM DRY TRAY LF: Brand: MEDLINE INDUSTRIES, INC.

## (undated) DEVICE — MEDI-VAC YANK SUCT HNDL W/TPRD BULBOUS TIP: Brand: CARDINAL HEALTH

## (undated) DEVICE — BETHLEHEM UNIVERSAL MINOR VAG: Brand: CARDINAL HEALTH

## (undated) DEVICE — HEMOSTAT POWDER ADSORB SURGICEL 3GM

## (undated) DEVICE — X-RAY DETECTABLE SPONGES,16 PLY: Brand: VISTEC

## (undated) DEVICE — BETHLEHEM UNIVERSAL GYN LAP PK: Brand: CARDINAL HEALTH

## (undated) DEVICE — GLOVE INDICATOR PI UNDERGLOVE SZ 7.5 BLUE

## (undated) DEVICE — LAPAROSCOPIC TROCAR SLEEVE/SINGLE USE: Brand: KII® OPTICAL ACCESS SYSTEM

## (undated) DEVICE — INTENDED FOR TISSUE SEPARATION, AND OTHER PROCEDURES THAT REQUIRE A SHARP SURGICAL BLADE TO PUNCTURE OR CUT.: Brand: BARD-PARKER SAFETY BLADES SIZE 11, STERILE

## (undated) DEVICE — CHLORHEXIDINE 4PCT 4 OZ

## (undated) DEVICE — TUBING SUCTION 5MM X 12 FT

## (undated) DEVICE — ADHESIVE SKIN HIGH VISCOSITY EXOFIN 1ML

## (undated) DEVICE — SYRINGE 50ML LL

## (undated) DEVICE — VISUALIZATION SYSTEM: Brand: CLEARIFY

## (undated) DEVICE — PENCIL ELECTROSURG E-Z CLEAN -0035H

## (undated) DEVICE — UTERINE MANIPULATOR RUMI 5.1 X 6 CM